# Patient Record
Sex: FEMALE | Race: WHITE | NOT HISPANIC OR LATINO
[De-identification: names, ages, dates, MRNs, and addresses within clinical notes are randomized per-mention and may not be internally consistent; named-entity substitution may affect disease eponyms.]

---

## 2017-01-20 ENCOUNTER — APPOINTMENT (OUTPATIENT)
Dept: NEUROSURGERY | Facility: CLINIC | Age: 62
End: 2017-01-20

## 2017-01-20 VITALS
WEIGHT: 130 LBS | DIASTOLIC BLOOD PRESSURE: 77 MMHG | OXYGEN SATURATION: 98 % | RESPIRATION RATE: 16 BRPM | TEMPERATURE: 98.5 F | SYSTOLIC BLOOD PRESSURE: 121 MMHG | BODY MASS INDEX: 22.2 KG/M2 | HEIGHT: 64 IN | HEART RATE: 79 BPM

## 2017-01-20 DIAGNOSIS — G50.0 TRIGEMINAL NEURALGIA: ICD-10-CM

## 2020-11-10 ENCOUNTER — APPOINTMENT (OUTPATIENT)
Dept: ORTHOPEDIC SURGERY | Facility: CLINIC | Age: 65
End: 2020-11-10
Payer: MEDICARE

## 2020-11-10 VITALS
BODY MASS INDEX: 22.2 KG/M2 | HEIGHT: 64 IN | WEIGHT: 130 LBS | SYSTOLIC BLOOD PRESSURE: 115 MMHG | DIASTOLIC BLOOD PRESSURE: 74 MMHG | HEART RATE: 67 BPM

## 2020-11-10 DIAGNOSIS — R20.2 PARESTHESIA OF SKIN: ICD-10-CM

## 2020-11-10 PROCEDURE — 99204 OFFICE O/P NEW MOD 45 MIN: CPT

## 2020-11-10 NOTE — HISTORY OF PRESENT ILLNESS
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 Had pain at site but no obvious bleeding. Finger began to swell with erythema and went to urgent care 11/5/20. She was placed on oral abx 250mg cephlex 4 times a day and she states it wasn’t getting better so she increased dose. patient still reported minimal improvement and then over the weekend noticed improvements. denies fevers or chills. reported numbness in the finger that has resolved,of note she has baseline numbness in hand due to lupus. denies previous injury to finger. she has been taking NSAIDs over the counter for toe cellulitis, which has helped. elevation and rest relieved the pain. movement and pressure made the pain worse. in regards to her hand and finger numbness she reports that she was told by her rheumatologist that she has carpal tunnel syndrome. she had prior nerve study over 3 years ago that confirmed diagnosis. she reports continued hand numbness worse with driving but does not wake her up at night.

## 2020-11-10 NOTE — PHYSICAL EXAM
[de-identified] : Physical exam shows the patient to be alert and oriented x3, capable of ambulation. The patient is well-developed and well-nourished in no apparent respiratory distress. Majority of the skin is intact bilaterally in the upper extremities without lymphadenopathy at the elbows.\par \par There is a negative Spurling test. There is no sensitivity or Tinel's over the axilla bilaterally in the area of the brachial plexus.\par \par The elbows have a symmetric and full range of motion with no pain upon forced flexion or extension bilaterally. There is no tenderness over the medial or lateral epicondyles bilaterally. The biceps and triceps are intact bilaterally with 5 over 5 strength. There is no joint effusion or instability of the medial and lateral collateral ligament complex bilaterally. There is no sensitivity of the median ulnar or radial nerves with provocative tests bilaterally.\par \par The wrists have a symmetric range of motion bilaterally. There is no tenderness over the scaphoid, scapholunate or lunotriquetral ligaments bilaterally. There is a negative Staples test bilaterally. There is negative tenderness over the radial ulnar joint or TFCC and no evidence of instability bilaterally. No tenderness over the pisotriquetral or hamate hook or CMC joint bilaterally. There is 5 over 5 strength of the wrists bilaterally.\par \par There is a negative Finkelstein test bilaterally and no tenderness over the A1 pulleys. There is no tenderness or instability of the MP PIP or DIP joints in the digits bilaterally with no evidence of digital malrotation.\par \par There is no sensitivity or masses around the ulnar nerve at the level of the wrist bilaterally.\par \par \par There is 2+ pulses over the radial arteries bilaterally with good capillary refill.\par \par There is a negative Tinel's and a positive Phalen's test at 15 seconds on the left. There is no thenar atrophy, good opposition is present. The remaining intrinsic musculature has good strength bilaterally.\par \par Sensation is intact in the median nerve distribution\par

## 2020-12-04 ENCOUNTER — TRANSCRIPTION ENCOUNTER (OUTPATIENT)
Age: 65
End: 2020-12-04

## 2021-03-31 ENCOUNTER — TRANSCRIPTION ENCOUNTER (OUTPATIENT)
Age: 66
End: 2021-03-31

## 2021-04-12 ENCOUNTER — APPOINTMENT (OUTPATIENT)
Dept: NEUROSURGERY | Facility: CLINIC | Age: 66
End: 2021-04-12

## 2021-04-13 ENCOUNTER — APPOINTMENT (OUTPATIENT)
Dept: NEUROSURGERY | Facility: CLINIC | Age: 66
End: 2021-04-13
Payer: MEDICARE

## 2021-04-13 VITALS
RESPIRATION RATE: 16 BRPM | OXYGEN SATURATION: 97 % | SYSTOLIC BLOOD PRESSURE: 137 MMHG | DIASTOLIC BLOOD PRESSURE: 80 MMHG | TEMPERATURE: 98.1 F | WEIGHT: 140 LBS | HEART RATE: 61 BPM | HEIGHT: 64 IN | BODY MASS INDEX: 23.9 KG/M2

## 2021-04-13 PROCEDURE — 99203 OFFICE O/P NEW LOW 30 MIN: CPT

## 2021-04-15 NOTE — HISTORY OF PRESENT ILLNESS
[de-identified] : LEFT pulsatile tinnitus\par - started 6 months ago\par - in sync with heart beat\par - progressively worse\par \par Also non-pulsatile tinnitus bilateral\par \par Hx of surgery 5 years ago for Trigeminal Neuralgia\par \par MRA w/o contrast without abnormal vessels to suggest DAVF\par

## 2021-04-15 NOTE — ASSESSMENT
[FreeTextEntry1] : LEFT pulsatile tinnitus\par Concern for dural fistula given history of prior surgery\par \par PLAN\par MRA with and without contrast (TRICKS) to rule out occult  dural arteriovenous fistula (DAVF)\par Discuss indication for catheter angiogram after the above

## 2021-04-21 ENCOUNTER — TRANSCRIPTION ENCOUNTER (OUTPATIENT)
Age: 66
End: 2021-04-21

## 2021-05-11 ENCOUNTER — APPOINTMENT (OUTPATIENT)
Dept: MRI IMAGING | Facility: CLINIC | Age: 66
End: 2021-05-11
Payer: MEDICARE

## 2021-05-11 ENCOUNTER — OUTPATIENT (OUTPATIENT)
Dept: OUTPATIENT SERVICES | Facility: HOSPITAL | Age: 66
LOS: 1 days | End: 2021-05-11
Payer: MEDICARE

## 2021-05-11 DIAGNOSIS — H93.A9 PULSATILE TINNITUS, UNSPECIFIED EAR: ICD-10-CM

## 2021-05-11 DIAGNOSIS — Z98.1 ARTHRODESIS STATUS: Chronic | ICD-10-CM

## 2021-05-11 DIAGNOSIS — Z98.89 OTHER SPECIFIED POSTPROCEDURAL STATES: Chronic | ICD-10-CM

## 2021-05-11 PROCEDURE — A9585: CPT

## 2021-05-11 PROCEDURE — G1004: CPT

## 2021-05-11 PROCEDURE — 70546 MR ANGIOGRAPH HEAD W/O&W/DYE: CPT | Mod: 26,ME

## 2021-05-11 PROCEDURE — 70546 MR ANGIOGRAPH HEAD W/O&W/DYE: CPT

## 2021-06-03 ENCOUNTER — APPOINTMENT (OUTPATIENT)
Dept: NEUROSURGERY | Facility: CLINIC | Age: 66
End: 2021-06-03
Payer: MEDICARE

## 2021-06-03 PROCEDURE — 99213 OFFICE O/P EST LOW 20 MIN: CPT | Mod: 95

## 2021-06-03 NOTE — HISTORY OF PRESENT ILLNESS
[FreeTextEntry1] : Ms. Zelaya is a pleasant 67yo female who presents today for follow up of left pulsatile tinnitus and MRA review.\par \par LEFT pulsatile tinnitus\par - started 8 months ago\par - in sync with heart beat\par - progressively worse\par - constant\par \par Also non-pulsatile tinnitus bilateral, worse recently.\par \par The pulsatile tinnitus is the most bothersome.\par \par Hx of surgery 5 years ago for Trigeminal Neuralgia\par \par Had recent MRA TRICKS with abnormal enhancement in the surgical area but no obvious AV shunt

## 2021-06-03 NOTE — REASON FOR VISIT
[Home] : at home, [unfilled] , at the time of the visit. [Medical Office: ([facility])___] : at the medical office located in  [Verbal consent obtained from patient] : the patient, [unfilled] [Follow-Up: _____] : a [unfilled] follow-up visit

## 2021-06-03 NOTE — DATA REVIEWED
[de-identified] : \par EXAM: MR ANGIO BRAIN WAW IC\par \par \par PROCEDURE DATE: 05/11/2021\par \par \par \par INTERPRETATION: Clinical indications: Left-sided pulsatile tinnitus.\par \par MRA of the [place] was performed using 3-D [place] technique. The patient was injected with approximately 7 cc Gadavist IV with 3 cc contrast carotid. Gadolinium infusion MRA the [place] was performed.\par \par Postop changes compatible with a left suboccipital craniotomy is identified.\par \par Both distal internal carotid arteries demonstrate normal flow. Evaluation of the anterior cerebral, middle cerebral, basilar and posterior cerebral arteries appear normal without evidence of an aneurysm or significant stenosis. Prominent P-comm is seen on the left side. The dural venous sinuses demonstrate normal enhancement.\par \par Both internal jugular veins and large intracerebral veins appear normal.\par \par IMPRESSION: Unremarkable MRA of the [place].\par \par \par \par \par \par \par SILVER ELIZABETH M.D., ATTENDING RADIOLOGIST\par This document has been electronically signed. May 11 2021 4:03PM

## 2021-06-03 NOTE — ASSESSMENT
[FreeTextEntry1] : LEFT pulsatile tinnitus, constant and very bothersome.\par \par MRA with no obvious AV shunt but given history of surgery in the LEFT mastoid area, I recommended catheter cerebral angiogram for evaluation.\par \par The risks, benefits and alternatives of catheter angiogram were discussed with the patient in detail. In my personal experience, the risks are very rare, but the possibility is not zero. Risks include stroke, brain hemorrhage, any type of disability (facial or extremity weakness, facial or extremity numbness, speech difficulties, blindness) and others. There are also possible complications related to the incisions such as infection, pain, swelling and bleeding.\par \par PLAN\par Catheter Cerebral Angiogram/ Venogram/ Manometry

## 2021-06-03 NOTE — REVIEW OF SYSTEMS
[As Noted in HPI] : as noted in HPI [Negative] : Heme/Lymph [FreeTextEntry4] : Pulsatile and non pulsatile tinnitus

## 2021-06-21 ENCOUNTER — TRANSCRIPTION ENCOUNTER (OUTPATIENT)
Age: 66
End: 2021-06-21

## 2021-06-22 ENCOUNTER — NON-APPOINTMENT (OUTPATIENT)
Age: 66
End: 2021-06-22

## 2021-06-22 ENCOUNTER — TRANSCRIPTION ENCOUNTER (OUTPATIENT)
Age: 66
End: 2021-06-22

## 2021-07-19 ENCOUNTER — OUTPATIENT (OUTPATIENT)
Dept: OUTPATIENT SERVICES | Facility: HOSPITAL | Age: 66
LOS: 1 days | End: 2021-07-19
Payer: MEDICARE

## 2021-07-19 VITALS
TEMPERATURE: 98 F | DIASTOLIC BLOOD PRESSURE: 76 MMHG | WEIGHT: 138.01 LBS | HEIGHT: 65 IN | HEART RATE: 56 BPM | SYSTOLIC BLOOD PRESSURE: 126 MMHG | RESPIRATION RATE: 18 BRPM | OXYGEN SATURATION: 99 %

## 2021-07-19 DIAGNOSIS — H93.A9 PULSATILE TINNITUS, UNSPECIFIED EAR: ICD-10-CM

## 2021-07-19 DIAGNOSIS — Z98.1 ARTHRODESIS STATUS: Chronic | ICD-10-CM

## 2021-07-19 DIAGNOSIS — K21.9 GASTRO-ESOPHAGEAL REFLUX DISEASE WITHOUT ESOPHAGITIS: ICD-10-CM

## 2021-07-19 DIAGNOSIS — M32.9 SYSTEMIC LUPUS ERYTHEMATOSUS, UNSPECIFIED: ICD-10-CM

## 2021-07-19 DIAGNOSIS — F41.9 ANXIETY DISORDER, UNSPECIFIED: ICD-10-CM

## 2021-07-19 DIAGNOSIS — Z98.890 OTHER SPECIFIED POSTPROCEDURAL STATES: Chronic | ICD-10-CM

## 2021-07-19 DIAGNOSIS — Z98.89 OTHER SPECIFIED POSTPROCEDURAL STATES: Chronic | ICD-10-CM

## 2021-07-19 DIAGNOSIS — Z01.818 ENCOUNTER FOR OTHER PREPROCEDURAL EXAMINATION: ICD-10-CM

## 2021-07-19 LAB
ANION GAP SERPL CALC-SCNC: 13 MMOL/L — SIGNIFICANT CHANGE UP (ref 5–17)
APTT BLD: 32.9 SEC — SIGNIFICANT CHANGE UP (ref 27.5–35.5)
BUN SERPL-MCNC: 15 MG/DL — SIGNIFICANT CHANGE UP (ref 7–23)
CALCIUM SERPL-MCNC: 9.7 MG/DL — SIGNIFICANT CHANGE UP (ref 8.4–10.5)
CHLORIDE SERPL-SCNC: 95 MMOL/L — LOW (ref 96–108)
CO2 SERPL-SCNC: 25 MMOL/L — SIGNIFICANT CHANGE UP (ref 22–31)
CREAT SERPL-MCNC: 0.75 MG/DL — SIGNIFICANT CHANGE UP (ref 0.5–1.3)
GLUCOSE SERPL-MCNC: 99 MG/DL — SIGNIFICANT CHANGE UP (ref 70–99)
HCT VFR BLD CALC: 43.5 % — SIGNIFICANT CHANGE UP (ref 34.5–45)
HGB BLD-MCNC: 14.5 G/DL — SIGNIFICANT CHANGE UP (ref 11.5–15.5)
INR BLD: 1.04 RATIO — SIGNIFICANT CHANGE UP (ref 0.88–1.16)
MCHC RBC-ENTMCNC: 32.2 PG — SIGNIFICANT CHANGE UP (ref 27–34)
MCHC RBC-ENTMCNC: 33.3 GM/DL — SIGNIFICANT CHANGE UP (ref 32–36)
MCV RBC AUTO: 96.7 FL — SIGNIFICANT CHANGE UP (ref 80–100)
NRBC # BLD: 0 /100 WBCS — SIGNIFICANT CHANGE UP (ref 0–0)
PLATELET # BLD AUTO: 248 K/UL — SIGNIFICANT CHANGE UP (ref 150–400)
POTASSIUM SERPL-MCNC: 4.3 MMOL/L — SIGNIFICANT CHANGE UP (ref 3.5–5.3)
POTASSIUM SERPL-SCNC: 4.3 MMOL/L — SIGNIFICANT CHANGE UP (ref 3.5–5.3)
PROTHROM AB SERPL-ACNC: 12.5 SEC — SIGNIFICANT CHANGE UP (ref 10.6–13.6)
RBC # BLD: 4.5 M/UL — SIGNIFICANT CHANGE UP (ref 3.8–5.2)
RBC # FLD: 11.9 % — SIGNIFICANT CHANGE UP (ref 10.3–14.5)
SODIUM SERPL-SCNC: 133 MMOL/L — LOW (ref 135–145)
WBC # BLD: 6.1 K/UL — SIGNIFICANT CHANGE UP (ref 3.8–10.5)
WBC # FLD AUTO: 6.1 K/UL — SIGNIFICANT CHANGE UP (ref 3.8–10.5)

## 2021-07-19 PROCEDURE — 80048 BASIC METABOLIC PNL TOTAL CA: CPT

## 2021-07-19 PROCEDURE — 85730 THROMBOPLASTIN TIME PARTIAL: CPT

## 2021-07-19 PROCEDURE — 85610 PROTHROMBIN TIME: CPT

## 2021-07-19 PROCEDURE — G0463: CPT

## 2021-07-19 PROCEDURE — 85027 COMPLETE CBC AUTOMATED: CPT

## 2021-07-19 NOTE — H&P PST ADULT - NSANTHOSAYNRD_GEN_A_CORE
No. ALBAN screening performed.  STOP BANG Legend: 0-2 = LOW Risk; 3-4 = INTERMEDIATE Risk; 5-8 = HIGH Risk

## 2021-07-19 NOTE — H&P PST ADULT - NSICDXPASTMEDICALHX_GEN_ALL_CORE_FT
PAST MEDICAL HISTORY:  COVID-19 virus infection November 2020  (SOB, treated at home on nebulizer, no hospitalization)    History of sciatica right side mostly    Lupus aymptomatic  dx x 23 years   no exarbation    Trigeminal neuralgia      PAST MEDICAL HISTORY:  Anxiety disorder     COVID-19 virus infection November 2020  (SOB, treated at home on nebulizer, no hospitalization)    History of sciatica right side mostly    Lupus aymptomatic  dx x 23 years   no exarbation    Trigeminal neuralgia

## 2021-07-19 NOTE — H&P PST ADULT - NSICDXPASTSURGICALHX_GEN_ALL_CORE_FT
PAST SURGICAL HISTORY:  H/O brain surgery for trigeminal neuralgia 2016 shunt placed    History of endoscopy h/o upper and lower endoscopy 2021  colonoscopy with polypectomy (x 2 polyps benign)    S/P carpal tunnel release right  2014    S/P spinal fusion 2010  lumbar 3-4

## 2021-07-19 NOTE — H&P PST ADULT - HISTORY OF PRESENT ILLNESS
covid 11/2020 (SOB nebulizer 66 year old female s/p left retrosigmoid craniotomy with microvascular decompression of left trigeminal nerve with Dr. Rod on 10/11/2016, c/o worsening pulsatile tinnitus x 1 year, recent MRA showed no AV-shunt, but was advised to have a venogram for evaluation given her surgical history in 2016. Patient denies any headaches, no blurred vision, occasional vertigo. Her medical history also significant for systemic lupus x 23 years (no exacerbation), previous covid-19 infection in 11/2020 (no hospitalization, had some SOB, treated with nebulizer), GERD on medication, sciatica (mostly right side), lumbar spine fusion (L3-4) 2010, right carpal tunnel release (2014), recent colonoscopy/ polypectomy 2021, anxiety disorders, former smoker 10pk/yrs (quit 19 yrs ago).  Patient is fully vaccinated.

## 2021-07-19 NOTE — H&P PST ADULT - NSICDXPROBLEM_GEN_ALL_CORE_FT
PROBLEM DIAGNOSES  Problem: Systemic lupus erythematosus  Assessment and Plan: scheduled for venogram   pre-op instruction discussed and patient verbalized understanding    Problem: Anxiety disorder  Assessment and Plan: instructed to continue     Problem: GERD (gastroesophageal reflux disease)  Assessment and Plan:     Problem: Pulsatile tinnitus  Assessment and Plan:        PROBLEM DIAGNOSES  Problem: Pulsatile tinnitus  Assessment and Plan: scheduled for venogram   preop instruction discussed and patient verbalized understanding     Problem: Systemic lupus erythematosus  Assessment and Plan: instructed to continue medication per prescribing physician     Problem: Anxiety disorder  Assessment and Plan: instructed to continue medication per prescribing physician     Problem: GERD (gastroesophageal reflux disease)  Assessment and Plan: instructed to continue medication per prescribing physician

## 2021-07-21 ENCOUNTER — NON-APPOINTMENT (OUTPATIENT)
Age: 66
End: 2021-07-21

## 2021-07-21 ENCOUNTER — APPOINTMENT (OUTPATIENT)
Dept: NEUROSURGERY | Facility: HOSPITAL | Age: 66
End: 2021-07-21

## 2021-07-21 ENCOUNTER — OUTPATIENT (OUTPATIENT)
Dept: OUTPATIENT SERVICES | Facility: HOSPITAL | Age: 66
LOS: 1 days | End: 2021-07-21
Payer: MEDICARE

## 2021-07-21 VITALS
HEIGHT: 65 IN | HEART RATE: 70 BPM | SYSTOLIC BLOOD PRESSURE: 141 MMHG | WEIGHT: 138.89 LBS | OXYGEN SATURATION: 100 % | TEMPERATURE: 98 F | RESPIRATION RATE: 18 BRPM | DIASTOLIC BLOOD PRESSURE: 88 MMHG

## 2021-07-21 VITALS
OXYGEN SATURATION: 98 % | HEART RATE: 67 BPM | RESPIRATION RATE: 14 BRPM | SYSTOLIC BLOOD PRESSURE: 130 MMHG | DIASTOLIC BLOOD PRESSURE: 71 MMHG

## 2021-07-21 DIAGNOSIS — Z01.818 ENCOUNTER FOR OTHER PREPROCEDURAL EXAMINATION: ICD-10-CM

## 2021-07-21 DIAGNOSIS — Z98.890 OTHER SPECIFIED POSTPROCEDURAL STATES: Chronic | ICD-10-CM

## 2021-07-21 DIAGNOSIS — Z98.89 OTHER SPECIFIED POSTPROCEDURAL STATES: Chronic | ICD-10-CM

## 2021-07-21 DIAGNOSIS — H93.A9 PULSATILE TINNITUS, UNSPECIFIED EAR: ICD-10-CM

## 2021-07-21 DIAGNOSIS — Z98.1 ARTHRODESIS STATUS: Chronic | ICD-10-CM

## 2021-07-21 PROCEDURE — 61623 EVASC TEMP BALO ARTL OCC H/N: CPT

## 2021-07-21 PROCEDURE — 36226 PLACE CATH VERTEBRAL ART: CPT

## 2021-07-21 PROCEDURE — C2628: CPT

## 2021-07-21 PROCEDURE — C1769: CPT

## 2021-07-21 PROCEDURE — 36226 PLACE CATH VERTEBRAL ART: CPT | Mod: 50

## 2021-07-21 PROCEDURE — C1887: CPT

## 2021-07-21 PROCEDURE — C1894: CPT

## 2021-07-21 PROCEDURE — 36223 PLACE CATH CAROTID/INOM ART: CPT | Mod: 50

## 2021-07-21 PROCEDURE — 75860 VEIN X-RAY NECK: CPT | Mod: 26,59

## 2021-07-21 PROCEDURE — 36223 PLACE CATH CAROTID/INOM ART: CPT

## 2021-07-21 PROCEDURE — 36012 PLACE CATHETER IN VEIN: CPT | Mod: LT

## 2021-07-21 PROCEDURE — C1760: CPT

## 2021-07-21 PROCEDURE — 75870 VEIN X-RAY SKULL: CPT | Mod: 26,59

## 2021-07-21 RX ORDER — SODIUM CHLORIDE 9 MG/ML
1000 INJECTION, SOLUTION INTRAVENOUS
Refills: 0 | Status: DISCONTINUED | OUTPATIENT
Start: 2021-07-21 | End: 2021-08-05

## 2021-07-21 RX ORDER — SODIUM CHLORIDE 9 MG/ML
1000 INJECTION INTRAMUSCULAR; INTRAVENOUS; SUBCUTANEOUS
Refills: 0 | Status: DISCONTINUED | OUTPATIENT
Start: 2021-07-21 | End: 2021-08-05

## 2021-07-21 NOTE — ASU PATIENT PROFILE, ADULT - PMH
Anxiety disorder    COVID-19 virus infection  November 2020  (SOB, treated at home on nebulizer, no hospitalization)  History of sciatica  right side mostly  Lupus  aymptomatic  dx x 23 years   no exarbation  Trigeminal neuralgia

## 2021-07-21 NOTE — ASU PATIENT PROFILE, ADULT - PSH
H/O brain surgery  for trigeminal neuralgia 2016 shunt placed  History of endoscopy  h/o upper and lower endoscopy 2021  colonoscopy with polypectomy (x 2 polyps benign)  S/P carpal tunnel release  right  2014  S/P spinal fusion  2010  lumbar 3-4

## 2021-07-21 NOTE — ASU DISCHARGE PLAN (ADULT/PEDIATRIC) - CARE PROVIDER_API CALL
Lexy Fox; MPH)  Radiology  [address], [address]  [place] [address]  Phone: [phone]  Fax: [phone]  Follow Up Time:

## 2021-07-21 NOTE — CHART NOTE - NSCHARTNOTEFT_GEN_A_CORE
Interventional Neuro- Radiology   Procedure Note PA-TORRI    Procedure: Catheter Cerebral Angiogram   Pre- Procedure Diagnosis:  Post- Procedure Diagnosis:    : Dr Lexy Fox   fellow:     Dr Michael Cain   Physician Assistant: Erinn Pedro PA-C    Nurse:  Radiologic Tech:  Anesthesiologist:  Sheath:      I/Os: EBL less than 10cc  IV fluids:     cc     Urine output     cc    Contrast Omnipaque 240      cc             Vitals: BP         HR      Spo2     %          Preliminary Report:  Using a 5 South Sudanese long sheath to the right groin under MAC sedation via left vertebral artery,  left internal carotid artery, left external carotid artery, right vertebral artery, right internal carotid artery, right external carotid artery a selective cerebral angiography was performed and demonstrated                       Official note to follow.  Patient tolerated procedure well, hemodynamically stable, no change in neurological status compared to baseline.  Results discussed with neuro ICU team, patient and patient's family. Right groin sheath was removed, manual compression held to hemostasis for 20 minutes, no active bleeding, no hematoma, quick clot and safeguard balloon dressing applied at Interventional Neuro- Radiology   Procedure Note PA-C    Procedure: Catheter Cerebral Angiogram, venogram and manometry    Pre- Procedure Diagnosis:  Post- Procedure Diagnosis:    : Dr Lexy Fox   fellow:     Dr Michael Cobos   Physician Assistant: Erinn Pedro PA-C    Nurse:                   Kwaku Marc RN  Radiologic Tech:   Ross Bagley LRT  Anesthesiologist:  Dr Shannon Garcia   Sheath:                 5/4 short sheath to the right radial artery       I/Os: EBL less than 10cc  IV fluids: 200cc  Urine due to void  Contrast Omnipaque 240           Vitals: BP 91/50        HR 74     Spo2  100%          Preliminary Report:  Using a 5/4 Thai short sheath to the right radial artery under MAC sedation via left vertebral artery, left internal carotid artery, left external carotid artery, right vertebral artery, right internal carotid artery, right external carotid artery a selective cerebral angiography was performed and demonstrated                       Official note to follow.  Patient tolerated procedure well, hemodynamically stable, no change in neurological status compared to baseline. Results discussed with patient and patient's . Right groin sheath was removed, manual compression held to hemostasis for 20 minutes, no active bleeding, no hematoma, quick clot and safeguard balloon dressing applied at Interventional Neuro- Radiology   Procedure Note PA-C    Procedure: Catheter Cerebral Angiogram, venogram and manometry    Pre- Procedure Diagnosis: left pulsatile tinnitus   Post- Procedure Diagnosis: left pulsatile tinnitus     : Dr Lexy Fox   fellow:     Dr Michael Cobos   Physician Assistant: Erinn Pedro PA-C    Nurse:                   Kwaku Marc RN  Radiologic Tech:   Ross Bagley T  Anesthesiologist:  Dr Shannon Garcia   Sheath:                 5/4 short sheath to the right radial artery       I/Os: EBL less than 10cc  IV fluids: 200cc  Urine due to void  Contrast Omnipaque 240           Vitals: BP 91/50        HR 74     Spo2  100%          Preliminary Report:  Using a 5/4 Welsh short sheath to the right radial artery under MAC sedation via left vertebral artery, left internal carotid artery, left external carotid artery, right vertebral artery, right internal carotid artery, right external carotid artery a selective cerebral angiography was performed and demonstrated                       Official note to follow.  Patient tolerated procedure well, hemodynamically stable, no change in neurological status compared to baseline. Results discussed with patient and patient's . Right groin sheath was removed, manual compression held to hemostasis for 20 minutes, no active bleeding, no hematoma, quick clot and safeguard balloon dressing applied at Interventional Neuro- Radiology   Procedure Note PA-C    Procedure: Catheter Cerebral Angiogram, venogram and manometry    Pre- Procedure Diagnosis: left pulsatile tinnitus   Post- Procedure Diagnosis: left pulsatile tinnitus     : Dr Lexy Fox   fellow:     Dr Michael Cobos   Physician Assistant: Erinn Pedro PA-C  Resident:                Dr Kindra Vance     Nurse:                   Kwaku Marc RN  Radiologic Tech:   Ross Bagley LRT       Sean Meredith LRT  Anesthesiologist:  Dr Shannon Garcia   Sheath:                 5/4 short sheath to the right radial artery       I/Os: EBL less than 10cc  IV fluids: 200cc  Urine due to void  Contrast Omnipaque 240           Vitals: BP 91/50        HR 74     Spo2  100%          Preliminary Report:  Using a 5/4 Slovenian short sheath to the right radial artery under MAC sedation via left vertebral artery, left internal carotid artery, left external carotid artery, right vertebral artery, right internal carotid artery, right external carotid artery a selective cerebral angiography. Official note to follow.  Patient tolerated procedure well, hemodynamically stable, no change in neurological status compared to baseline. Results discussed with patient and patient's . Right groin sheath was removed, manual compression held to hemostasis for 20 minutes, no active bleeding, no hematoma, quick clot and safeguard balloon dressing applied at Interventional Neuro- Radiology   Procedure Note PA-C    Procedure: Catheter Cerebral Angiogram, venogram and balloon test occlusion     Pre- Procedure Diagnosis: left pulsatile tinnitus   Post- Procedure Diagnosis: left pulsatile tinnitus     : Dr Lexy Fox   fellow:     Dr Michael Cobos   Physician Assistant: MICHAEL SalcedoC  Resident:                Dr Kindra Vance     Nurse:                   Kwaku Marc RN  Radiologic Tech:   Ross Bagley LRT       Sean Meredith LRT  Anesthesiologist:  Dr Shannon Garcia   Sheath:                 5/4 short sheath to the right radial artery   Sheath:                 5 Cameroonian short sheath to the right femoral vein   ACT:                       174      I/Os: EBL less than 10cc  IV fluids: 350cc  Urine due to void  Contrast Omnipaque 240 132cc          Vitals: BP 91/50        HR 74     Spo2  100%          Preliminary Report:  Using a 5/4 Cam[place]onian short sheath to the right radial artery under MAC sedation a selective cerebral angiogram, venogram, balloon test occlusion were performed. Official note to follow.  Patient tolerated procedure well, hemodynamically stable, no change in neurological status compared to baseline. Results discussed with patient and patient's . Right groin sheath was removed, manual compression held to hemostasis for 20 minutes, no active bleeding, no hematoma, quick clot and safeguard balloon dressing applied. TR band was placed on right wrist. Disposition recovery room and then discharge home. Interventional Neuro- Radiology   Procedure Note PA-C    Procedure: Catheter Cerebral Angiogram, venogram and balloon test occlusion     Pre- Procedure Diagnosis: left pulsatile tinnitus   Post- Procedure Diagnosis: left pulsatile tinnitus     : Dr Lexy Fox   fellow:     Dr Michael Cobos   Physician Assistant: MICHAEL SalcedoC  Resident:                Dr Kindra Vance     Nurse:                   Kwaku Marc RN  Radiologic Tech:   Ross Bagley LRT       Sean Meredith LRT  Anesthesiologist:  Dr Shannon Garcia   Sheath:                 5/4 short sheath to the right radial artery   Sheath:                 5 Nepalese short sheath to the right femoral vein   ACT:                      174      I/Os: EBL less than 10cc  IV fluids: 350cc  Urine due to void  Contrast Omnipaque 240 132cc          Vitals: BP 91/50        HR 74     Spo2  100%          Preliminary Report:  Using a 5/4 Nepalese short sheath to the right radial artery under MAC sedation a selective cerebral angiogram, venogram, balloon test occlusion were performed. Official note to follow.  Patient tolerated procedure well, hemodynamically stable, no change in neurological status compared to baseline. Results discussed with patient and patient's . Right groin sheath was removed, manual compression held to hemostasis for 20 minutes, no active bleeding, no hematoma, quick clot and safeguard balloon dressing applied. TR band was placed on right wrist. Disposition recovery room and then discharge home. Interventional Neuro- Radiology   Procedure Note PA-C    Procedure: Catheter Cerebral Angiogram, venogram and balloon test occlusion     Pre- Procedure Diagnosis: left pulsatile tinnitus   Post- Procedure Diagnosis: left pulsatile tinnitus     : Dr Lexy Fox   fellow:     Dr Michael Cobos   Physician Assistant: MICHAEL SalcedoC  Resident:                Dr Kindra Vance     Nurse:                   Kwaku Marc RN  Radiologic Tech:   Ross Bagley LRT       Sean Meredith LRT  Anesthesiologist:  Dr Shannon Garcia   Sheath:                 5/4 short sheath to the right radial artery   Sheath:                 5 Burkinan short sheath to the right femoral vein   ACT:                     174      I/Os: EBL less than 10cc  IV fluids: 350cc  Urine due to void  Contrast Omnipaque 240 132cc          Vitals: BP 91/50        HR 74     Spo2  100%          Preliminary Report:  Using a 5/4 Burkinan short sheath to the right radial artery under MAC sedation a selective cerebral angiogram, venogram, balloon test occlusion were performed. Official note to follow.  Patient tolerated procedure well, hemodynamically stable, no change in neurological status compared to baseline. Results discussed with patient and patient's . Right groin sheath was removed, manual compression held to hemostasis for 20 minutes, no active bleeding, no hematoma, quick clot and safeguard balloon dressing applied. TR band was placed on right wrist. Disposition recovery room and then discharge home.

## 2021-07-21 NOTE — CHART NOTE - NSCHARTNOTEFT_GEN_A_CORE
Interventional Neuro Radiology  Pre-Procedure Note PA-C    This is a 66 year old right hand dominant female with left ear tinnitus, who presents to Neuro IR for a catheter cerebral venogram, angiogram, and manometry           Allergies: No Known Allergies  PMHX: anxiety,Trigeminal neuralgia, Lupus, COVID-19 virus infection, sciatica, trigeminal neuralgia   PSHX: spinal fusion, right carpal tunnel release, brain surgery  Social History:    FAMILY HISTORY: non-contributory   Current Medications:     Labs:                   Blood Bank:       Assessment/Plan:   This is a 66 year old right hand dominant female with left ear tinnitus   Patient presents to neuro-IR for selective cerebral angiography.   Procedure, goals, risks, benefits and alternatives  were discussed with patient and patient's family.  All questions were answered.  Risks include but are not limited to stroke, vessel injury, hemorrhage, and or right  groin hematoma.  Patient demonstrates understanding  of all risks involved with this procedure and wishes to continue.   Appropriate  content was obtained from patient and consent is in the patient's chart. Interventional Neuro Radiology  Pre-Procedure Note PA-C    This is a 66 year old right hand dominant female with left ear tinnitus, who presents to Neuro IR for a catheter cerebral venogram, angiogram, and manometry.    Upon exam patient is A + O x 3, speech is fluent, recent and remote intact, follow commands, motor 5/5, sensation intact, ambulates without assist.    Allergies: No Known Allergies  PMHX: Anxiety, Trigeminal neuralgia, Lupus, COVID-19 virus infection, sciatica, trigeminal neuralgia   PSHX: spinal fusion, right carpal tunnel release, brain surgery  Social History:    FAMILY HISTORY: non-contributory   Current Medications:   Covid; not detected       Assessment/Plan:   This is a 66 year old right hand dominant female with left ear tinnitus who presents to Neuro-IR for selective cerebral angiogram. Procedure, goals, risks, benefits and alternatives were discussed with patient and patient's . All questions were answered. Risks include but are not limited to stroke, vessel injury, hemorrhage, right wrist pain and or right groin hematoma. Patient demonstrates understanding of all risks involved with this procedure and wishes to continue. Appropriate content was obtained from patient and consent is in the patient's chart.

## 2021-07-30 DIAGNOSIS — G08 INTRACRANIAL AND INTRASPINAL PHLEBITIS AND THROMBOPHLEBITIS: ICD-10-CM

## 2021-07-30 DIAGNOSIS — H93.A2 PULSATILE TINNITUS, LEFT EAR: ICD-10-CM

## 2021-08-10 ENCOUNTER — NON-APPOINTMENT (OUTPATIENT)
Age: 66
End: 2021-08-10

## 2021-08-10 RX ORDER — ASPIRIN 325 MG/1
325 TABLET, FILM COATED ORAL DAILY
Qty: 30 | Refills: 6 | Status: ACTIVE | COMMUNITY
Start: 2021-08-10 | End: 1900-01-01

## 2021-09-17 PROBLEM — Z86.69 PERSONAL HISTORY OF OTHER DISEASES OF THE NERVOUS SYSTEM AND SENSE ORGANS: Chronic | Status: ACTIVE | Noted: 2021-07-19

## 2021-09-17 PROBLEM — U07.1 COVID-19: Chronic | Status: ACTIVE | Noted: 2021-07-19

## 2021-09-17 PROBLEM — F41.9 ANXIETY DISORDER, UNSPECIFIED: Chronic | Status: ACTIVE | Noted: 2021-07-19

## 2021-09-20 ENCOUNTER — OUTPATIENT (OUTPATIENT)
Dept: OUTPATIENT SERVICES | Facility: HOSPITAL | Age: 66
LOS: 1 days | End: 2021-09-20
Payer: MEDICARE

## 2021-09-20 VITALS
OXYGEN SATURATION: 97 % | DIASTOLIC BLOOD PRESSURE: 83 MMHG | RESPIRATION RATE: 18 BRPM | SYSTOLIC BLOOD PRESSURE: 126 MMHG | TEMPERATURE: 99 F | WEIGHT: 141.98 LBS | HEIGHT: 65 IN | HEART RATE: 63 BPM

## 2021-09-20 DIAGNOSIS — Z98.1 ARTHRODESIS STATUS: Chronic | ICD-10-CM

## 2021-09-20 DIAGNOSIS — Z01.818 ENCOUNTER FOR OTHER PREPROCEDURAL EXAMINATION: ICD-10-CM

## 2021-09-20 DIAGNOSIS — H93.A9 PULSATILE TINNITUS, UNSPECIFIED EAR: ICD-10-CM

## 2021-09-20 DIAGNOSIS — Z98.890 OTHER SPECIFIED POSTPROCEDURAL STATES: Chronic | ICD-10-CM

## 2021-09-20 DIAGNOSIS — Z86.69 PERSONAL HISTORY OF OTHER DISEASES OF THE NERVOUS SYSTEM AND SENSE ORGANS: ICD-10-CM

## 2021-09-20 DIAGNOSIS — Z98.89 OTHER SPECIFIED POSTPROCEDURAL STATES: Chronic | ICD-10-CM

## 2021-09-20 DIAGNOSIS — Z29.9 ENCOUNTER FOR PROPHYLACTIC MEASURES, UNSPECIFIED: ICD-10-CM

## 2021-09-20 LAB
ALBUMIN SERPL ELPH-MCNC: 5.2 G/DL — HIGH (ref 3.3–5)
ALP SERPL-CCNC: 50 U/L — SIGNIFICANT CHANGE UP (ref 40–120)
ALT FLD-CCNC: 18 U/L — SIGNIFICANT CHANGE UP (ref 10–45)
ANION GAP SERPL CALC-SCNC: 15 MMOL/L — SIGNIFICANT CHANGE UP (ref 5–17)
AST SERPL-CCNC: 24 U/L — SIGNIFICANT CHANGE UP (ref 10–40)
BILIRUB SERPL-MCNC: 0.4 MG/DL — SIGNIFICANT CHANGE UP (ref 0.2–1.2)
BUN SERPL-MCNC: 12 MG/DL — SIGNIFICANT CHANGE UP (ref 7–23)
CALCIUM SERPL-MCNC: 9.9 MG/DL — SIGNIFICANT CHANGE UP (ref 8.4–10.5)
CHLORIDE SERPL-SCNC: 99 MMOL/L — SIGNIFICANT CHANGE UP (ref 96–108)
CO2 SERPL-SCNC: 25 MMOL/L — SIGNIFICANT CHANGE UP (ref 22–31)
CREAT SERPL-MCNC: 0.87 MG/DL — SIGNIFICANT CHANGE UP (ref 0.5–1.3)
GLUCOSE SERPL-MCNC: 97 MG/DL — SIGNIFICANT CHANGE UP (ref 70–99)
HCT VFR BLD CALC: 42.7 % — SIGNIFICANT CHANGE UP (ref 34.5–45)
HGB BLD-MCNC: 14 G/DL — SIGNIFICANT CHANGE UP (ref 11.5–15.5)
INR BLD: 1.03 RATIO — SIGNIFICANT CHANGE UP (ref 0.88–1.16)
MCHC RBC-ENTMCNC: 32.8 GM/DL — SIGNIFICANT CHANGE UP (ref 32–36)
MCHC RBC-ENTMCNC: 33.2 PG — SIGNIFICANT CHANGE UP (ref 27–34)
MCV RBC AUTO: 101.2 FL — HIGH (ref 80–100)
NRBC # BLD: 0 /100 WBCS — SIGNIFICANT CHANGE UP (ref 0–0)
PLATELET # BLD AUTO: 213 K/UL — SIGNIFICANT CHANGE UP (ref 150–400)
POTASSIUM SERPL-MCNC: 4 MMOL/L — SIGNIFICANT CHANGE UP (ref 3.5–5.3)
POTASSIUM SERPL-SCNC: 4 MMOL/L — SIGNIFICANT CHANGE UP (ref 3.5–5.3)
PROT SERPL-MCNC: 7.2 G/DL — SIGNIFICANT CHANGE UP (ref 6–8.3)
PROTHROM AB SERPL-ACNC: 12.3 SEC — SIGNIFICANT CHANGE UP (ref 10.6–13.6)
RBC # BLD: 4.22 M/UL — SIGNIFICANT CHANGE UP (ref 3.8–5.2)
RBC # FLD: 11.8 % — SIGNIFICANT CHANGE UP (ref 10.3–14.5)
SODIUM SERPL-SCNC: 139 MMOL/L — SIGNIFICANT CHANGE UP (ref 135–145)
WBC # BLD: 5.43 K/UL — SIGNIFICANT CHANGE UP (ref 3.8–10.5)
WBC # FLD AUTO: 5.43 K/UL — SIGNIFICANT CHANGE UP (ref 3.8–10.5)

## 2021-09-20 PROCEDURE — G0463: CPT

## 2021-09-20 PROCEDURE — 86850 RBC ANTIBODY SCREEN: CPT

## 2021-09-20 PROCEDURE — 86901 BLOOD TYPING SEROLOGIC RH(D): CPT

## 2021-09-20 PROCEDURE — 86900 BLOOD TYPING SEROLOGIC ABO: CPT

## 2021-09-20 NOTE — H&P PST ADULT - NSICDXPASTSURGICALHX_GEN_ALL_CORE_FT
PAST SURGICAL HISTORY:  H/O brain surgery for trigeminal neuralgia 2016 shunt placed    History of endoscopy h/o upper and lower endoscopy 2021  colonoscopy with polypectomy (x 2 polyps benign)    Normal vaginal delivery     S/P carpal tunnel release right  2014    S/P spinal fusion 2010  lumbar 3-4

## 2021-09-20 NOTE — H&P PST ADULT - HISTORY OF PRESENT ILLNESS
66 year old female with h/o SLE (last flare x 4 months ago, trigeminal neuralgia, anxiety disorders, previous h/o of covid-19 infection (November 2020, no hospitalization), presents for preop testing for venous sinus stent for pulsatile tinnitus on 10/01/2021 with Dr. Fox. Patient denies any dizziness, no unsteady gait.  patient denies any s/s of covid-19 infection, nor any known exposure, scheduled for covid-19 PCR swab on 9/28/2021 at [facility].

## 2021-09-20 NOTE — H&P PST ADULT - NSICDXPASTMEDICALHX_GEN_ALL_CORE_FT
PAST MEDICAL HISTORY:  Anxiety disorder     COVID-19 virus infection November 2020  (SOB, treated at home on nebulizer, no hospitalization)    History of sciatica right side mostly    Lupus aymptomatic  dx x 23 years   last flare 4 months ado    Trigeminal neuralgia      PAST MEDICAL HISTORY:  Anxiety disorder     COVID-19 virus infection November 2020  (SOB, treated at home on nebulizer, no hospitalization)    History of sciatica right side mostly    History of tinnitus     Lupus dx x 23 years   last flare 4 months ago    Trigeminal neuralgia

## 2021-09-20 NOTE — H&P PST ADULT - PROBLEM SELECTOR PLAN 1
scheduled for venous sinus stent   preop instruction discussed and patient verbalized understanding  labs result pending

## 2021-09-20 NOTE — H&P PST ADULT - ASSESSMENT
CAPRINI SCORE [CLOT updated 18]    AGE RELATED RISK FACTORS                                                       MOBILITY RELATED FACTORS  [ ] Age 41-60 years                                            (1 Point)                    [ ] Bed rest                                                        (1 Point)  [2 ] Age: 61-74 years                                           (2 Points)                  [ ] Plaster cast                                                   (2 Points)  [ ] Age= 75 years                                              (3 Points)                    [ ] Bed bound for more than 72 hours                 (2 Points)    DISEASE RELATED RISK FACTORS                                               GENDER SPECIFIC FACTORS  [ ] Edema in the lower extremities                       (1 Point)              [ ] Pregnancy                                                     (1 Point)  [ ] Varicose veins                                               (1 Point)                     [ ] Post-partum < 6 weeks                                   (1 Point)             [ ] BMI > 25 Kg/m2                                            (1 Point)                     [ ] Hormonal therapy  or oral contraception          (1 Point)                 [ ] Sepsis (in the previous month)                        (1 Point)               [ ] History of pregnancy complications                 (1 point)  [ ] Pneumonia or serious lung disease                                               [ ] Unexplained or recurrent                     (1 Point)           (in the previous month)                               (1 Point)  [ ] Abnormal pulmonary function test                     (1 Point)                 SURGERY RELATED RISK FACTORS  [ ] Acute myocardial infarction                              (1 Point)               [ ]  Section                                             (1 Point)  [ ] Congestive heart failure (in the previous month)  (1 Point)      [ ] Minor surgery                                                  (1 Point)   [ ] Inflammatory bowel disease                             (1 Point)               [ ] Arthroscopic surgery                                        (2 Points)  [ ] Central venous access                                      (2 Points)                [2 ] General surgery lasting more than 45 minutes (2 points)  [ ] Present or previous malignancy                     (2 Points)                [ ] Elective arthroplasty                                         (5 points)    [ ] Stroke (in the previous month)                          (5 Points)                                                                                                                                                           HEMATOLOGY RELATED FACTORS                                                 TRAUMA RELATED RISK FACTORS  [ ] Prior episodes of VTE                                     (3 Points)                [ ] Fracture of the hip, pelvis, or leg                       (5 Points)  [ ] Positive family history for VTE                         (3 Points)             [ ] Acute spinal cord injury (in the previous month)  (5 Points)  [ ] Prothrombin 11313 A                                     (3 Points)               [ ] Paralysis  (less than 1 month)                             (5 Points)  [ ] Factor V Leiden                                             (3 Points)                  [ ] Multiple Trauma within 1 month                        (5 Points)  [ ] Lupus anticoagulants                                     (3 Points)                                                           [ ] Anticardiolipin antibodies                               (3 Points)                                                       [ ] High homocysteine in the blood                      (3 Points)                                             [ ] Other congenital or acquired thrombophilia      (3 Points)                                                [ ] Heparin induced thrombocytopenia                  (3 Points)                                     Total Score [4]

## 2021-09-23 PROBLEM — Z86.69 PERSONAL HISTORY OF OTHER DISEASES OF THE NERVOUS SYSTEM AND SENSE ORGANS: Chronic | Status: ACTIVE | Noted: 2021-09-20

## 2021-09-28 ENCOUNTER — OUTPATIENT (OUTPATIENT)
Dept: OUTPATIENT SERVICES | Facility: HOSPITAL | Age: 66
LOS: 1 days | End: 2021-09-28

## 2021-09-28 DIAGNOSIS — Z98.890 OTHER SPECIFIED POSTPROCEDURAL STATES: Chronic | ICD-10-CM

## 2021-09-28 DIAGNOSIS — Z98.1 ARTHRODESIS STATUS: Chronic | ICD-10-CM

## 2021-09-28 DIAGNOSIS — Z11.52 ENCOUNTER FOR SCREENING FOR COVID-19: ICD-10-CM

## 2021-09-28 DIAGNOSIS — Z98.89 OTHER SPECIFIED POSTPROCEDURAL STATES: Chronic | ICD-10-CM

## 2021-09-28 LAB — SARS-COV-2 RNA SPEC QL NAA+PROBE: SIGNIFICANT CHANGE UP

## 2021-10-01 ENCOUNTER — OUTPATIENT (OUTPATIENT)
Dept: INPATIENT UNIT | Facility: HOSPITAL | Age: 66
LOS: 1 days | End: 2021-10-01
Payer: MEDICARE

## 2021-10-01 ENCOUNTER — APPOINTMENT (OUTPATIENT)
Dept: NEUROSURGERY | Facility: HOSPITAL | Age: 66
End: 2021-10-01
Payer: MEDICARE

## 2021-10-01 VITALS
HEART RATE: 71 BPM | RESPIRATION RATE: 17 BRPM | OXYGEN SATURATION: 97 % | SYSTOLIC BLOOD PRESSURE: 111 MMHG | DIASTOLIC BLOOD PRESSURE: 72 MMHG

## 2021-10-01 VITALS
OXYGEN SATURATION: 100 % | HEIGHT: 65 IN | DIASTOLIC BLOOD PRESSURE: 91 MMHG | RESPIRATION RATE: 15 BRPM | SYSTOLIC BLOOD PRESSURE: 143 MMHG | WEIGHT: 141.98 LBS | TEMPERATURE: 98 F | HEART RATE: 65 BPM

## 2021-10-01 DIAGNOSIS — I86.8 VARICOSE VEINS OF OTHER SPECIFIED SITES: ICD-10-CM

## 2021-10-01 DIAGNOSIS — H93.A9 PULSATILE TINNITUS, UNSPECIFIED EAR: ICD-10-CM

## 2021-10-01 DIAGNOSIS — H93.A2 PULSATILE TINNITUS, LEFT EAR: ICD-10-CM

## 2021-10-01 DIAGNOSIS — Z98.89 OTHER SPECIFIED POSTPROCEDURAL STATES: Chronic | ICD-10-CM

## 2021-10-01 DIAGNOSIS — Z98.1 ARTHRODESIS STATUS: Chronic | ICD-10-CM

## 2021-10-01 DIAGNOSIS — Z98.890 OTHER SPECIFIED POSTPROCEDURAL STATES: Chronic | ICD-10-CM

## 2021-10-01 LAB
BLD GP AB SCN SERPL QL: NEGATIVE — SIGNIFICANT CHANGE UP
RH IG SCN BLD-IMP: NEGATIVE — SIGNIFICANT CHANGE UP

## 2021-10-01 PROCEDURE — C1894: CPT

## 2021-10-01 PROCEDURE — C9803: CPT

## 2021-10-01 PROCEDURE — C1876: CPT

## 2021-10-01 PROCEDURE — C1769: CPT

## 2021-10-01 PROCEDURE — C1760: CPT

## 2021-10-01 PROCEDURE — 86850 RBC ANTIBODY SCREEN: CPT

## 2021-10-01 PROCEDURE — C9399: CPT

## 2021-10-01 PROCEDURE — 87635 SARS-COV-2 COVID-19 AMP PRB: CPT

## 2021-10-01 PROCEDURE — 61624 TCAT PERM OCCLS/EMBOLJ CNS: CPT

## 2021-10-01 PROCEDURE — 75860 VEIN X-RAY NECK: CPT | Mod: 26,59

## 2021-10-01 PROCEDURE — 36012 PLACE CATHETER IN VEIN: CPT | Mod: LT

## 2021-10-01 PROCEDURE — 86901 BLOOD TYPING SEROLOGIC RH(D): CPT

## 2021-10-01 PROCEDURE — 75894 X-RAYS TRANSCATH THERAPY: CPT

## 2021-10-01 PROCEDURE — 75898 FOLLOW-UP ANGIOGRAPHY: CPT | Mod: 26

## 2021-10-01 PROCEDURE — 75898 FOLLOW-UP ANGIOGRAPHY: CPT

## 2021-10-01 PROCEDURE — C1889: CPT

## 2021-10-01 PROCEDURE — 36012 PLACE CATHETER IN VEIN: CPT

## 2021-10-01 PROCEDURE — 75870 VEIN X-RAY SKULL: CPT | Mod: 26,59

## 2021-10-01 PROCEDURE — 75894 X-RAYS TRANSCATH THERAPY: CPT | Mod: 26

## 2021-10-01 PROCEDURE — 86900 BLOOD TYPING SEROLOGIC ABO: CPT

## 2021-10-01 PROCEDURE — C1887: CPT

## 2021-10-01 RX ORDER — HYDROMORPHONE HYDROCHLORIDE 2 MG/ML
0.5 INJECTION INTRAMUSCULAR; INTRAVENOUS; SUBCUTANEOUS
Refills: 0 | Status: DISCONTINUED | OUTPATIENT
Start: 2021-10-01 | End: 2021-10-01

## 2021-10-01 RX ORDER — CHOLECALCIFEROL (VITAMIN D3) 125 MCG
0 CAPSULE ORAL
Qty: 0 | Refills: 0 | DISCHARGE

## 2021-10-01 RX ORDER — CLOPIDOGREL BISULFATE 75 MG/1
75 TABLET, FILM COATED ORAL ONCE
Refills: 0 | Status: COMPLETED | OUTPATIENT
Start: 2021-10-01 | End: 2021-10-01

## 2021-10-01 RX ORDER — BUPROPION HYDROCHLORIDE 150 MG/1
1 TABLET, EXTENDED RELEASE ORAL
Qty: 0 | Refills: 0 | DISCHARGE

## 2021-10-01 RX ORDER — OXYCODONE AND ACETAMINOPHEN 5; 325 MG/1; MG/1
1 TABLET ORAL
Qty: 16 | Refills: 0
Start: 2021-10-01 | End: 2021-10-04

## 2021-10-01 RX ORDER — ASPIRIN/CALCIUM CARB/MAGNESIUM 324 MG
325 TABLET ORAL DAILY
Refills: 0 | Status: DISCONTINUED | OUTPATIENT
Start: 2021-10-01 | End: 2021-10-01

## 2021-10-01 RX ORDER — OMEPRAZOLE 10 MG/1
1 CAPSULE, DELAYED RELEASE ORAL
Qty: 0 | Refills: 0 | DISCHARGE

## 2021-10-01 RX ORDER — SODIUM CHLORIDE 9 MG/ML
1000 INJECTION INTRAMUSCULAR; INTRAVENOUS; SUBCUTANEOUS
Refills: 0 | Status: DISCONTINUED | OUTPATIENT
Start: 2021-10-01 | End: 2021-10-01

## 2021-10-01 RX ORDER — CARBAMAZEPINE 200 MG
0 TABLET ORAL
Qty: 0 | Refills: 0 | DISCHARGE

## 2021-10-01 RX ORDER — ONDANSETRON 8 MG/1
4 TABLET, FILM COATED ORAL ONCE
Refills: 0 | Status: DISCONTINUED | OUTPATIENT
Start: 2021-10-01 | End: 2021-10-01

## 2021-10-01 RX ADMIN — Medication 325 MILLIGRAM(S): at 10:18

## 2021-10-01 RX ADMIN — CLOPIDOGREL BISULFATE 75 MILLIGRAM(S): 75 TABLET, FILM COATED ORAL at 10:18

## 2021-10-01 NOTE — ASU DISCHARGE PLAN (ADULT/PEDIATRIC) - NURSING INSTRUCTIONS
difficulty breathing Please feel free to contact us at [phone] if any problems arise. After 6PM, Monday through Friday, on weekends and on holidays, please call [phone] and ask for the radiology resident on call to be paged.

## 2021-10-01 NOTE — CHART NOTE - NSCHARTNOTEFT_GEN_A_CORE
Interventional Neuro Radiology  Pre-Procedure Note PA-C    This is a 66 year old right female with h/o SLE (last flare x 4 months ago, trigeminal neuralgia, anxiety disorders, previous h/o of covid-19 infection (November 2020, no hospitalization), presents for preop testing for venous sinus stent for pulsatile tinnitus on 10/01/2021 with Dr. Fox. Patient denies any dizziness, no unsteady gait.  patient denies any s/s of covid-19 infection, nor any known exposure, scheduled for covid-19 PCR swab on 9/28/2021 at [facility]. (20 Sep 2021 15:49)      Allergies: No Known Allergies  PMHX: Trigeminal neuralgia, Lupus, COVID-19 virus infection, sciatica      Anxiety disorder    History of tinnitus    S/P spinal fusion  2010  lumbar 3-4    S/P carpal tunnel release  right  2014    H/O brain surgery  for trigeminal neuralgia 2016 shunt placed    History of endoscopy  h/o upper and lower endoscopy 2021  colonoscopy with polypectomy (x 2 polyps benign)    Normal vaginal delivery        Social History:     FAMILY HISTORY:      Current Medications: aspirin 325 milliGRAM(s) Oral daily  clopidogrel Tablet 75 milliGRAM(s) Oral once      Labs:                   Blood Bank:       Assessment/Plan:     This is a 66y  year old right  hand dominant Female  presents with   Patient presents to neuro-IR for selective cerebral angiography.   Procedure, goals, risks, benefits and alternatives  were discussed with patient and patient's family.  All questions were answered.  Risks include but are not limited to stroke, vessel injury, hemorrhage, and or right  groin hematoma.  Patient demonstrates understanding  of all risks involved with this procedure and wishes to continue.   Appropriate  content was obtained from patient and consent is in the patient's chart. Interventional Neuro Radiology  Pre-Procedure Note PA-C    This is a 66 year old right female with h/o SLE (last flare x 4 months ago, trigeminal neuralgia, anxiety disorders, previous h/o of covid-19 infection (November 2020, no hospitalization), presents to Neuro IR for venous sinus stent for pulsatile tinnitus with Dr. Fox.     Upon exam: Patient is A + O x 3, speech is fluent, recent and remote memory intact, follows commands, PERRL, tongue is midline, +facial symmetry, moves all extremities, sensation intact, motor 5/5, ambulates without assist.       Allergies: No Known Allergies  PMHX: Trigeminal neuralgia, Lupus, COVID-19 virus infection, sciatica, Anxiety disorder, left tinnitus,  PSHX: Lumbar fusion, right carpal tunnel release, brain surgery for trigeminal neuralgia 2016 shunt placed, endoscopy, colonoscopy with polypectomy  Social History: , non-tobacco smoker   FAMILY HISTORY: non-contributory   Current Medications: aspirin 325 milliGRAM(s) Oral daily  clopidogrel Tablet 75 milliGRAM(s) Oral once    Covid: not detected   CBC:         14.0  5.43  42.7  213      139   99   12    4.0   25   0.87  97     Blood Bank: O negative     Assessment/Plan:   This is a 66 year old right hand dominant female with left tinnitus, patient presents to Neuro-IR for selective cerebral angiography. Procedure, goals, risks, benefits and alternatives were discussed with patient and patient's . All questions were answered. Risks include but are not limited to stroke, vessel injury, hemorrhage, and or right groin hematoma. Patient demonstrates understanding of all risks involved with this procedure and wishes to continue. Appropriate content was obtained from patient and consent is in the patient's chart.

## 2021-10-01 NOTE — ASU DISCHARGE PLAN (ADULT/PEDIATRIC) - ASU DC SPECIAL INSTRUCTIONSFT
Please take Aspirin and PLavix daily   Please take medrol dose nate    Please do not drive while taking Percocet Please take Aspirin and Plavix daily   Please take medrol dose pack    Please do not drive while taking Percocet

## 2021-10-01 NOTE — ASU DISCHARGE PLAN (ADULT/PEDIATRIC) - CARE PROVIDER_API CALL
Lexy Fox; MPH)  Radiology  [address], [address]  [place] [address]  Phone: [phone]  Fax: [phone]  Follow Up Time:

## 2021-10-01 NOTE — CHART NOTE - NSCHARTNOTEFT_GEN_A_CORE
Interventional Neuro- Radiology   Procedure Note PA-C    Procedure: Catheter Cerebral venogram, cordis precise stent placed in left sigmoid sinus, and coiling of venous aneurysm with 7 coils   Pre- Procedure Diagnosis: left ear tinnitus   Post- Procedure Diagnosis: same     : Dr Ezekiel Cabrales  Physician Assistant: Erinn Pedro PA-C  Resident:                    Dr Sully Spence     Nurse:                        Kwaku Marc RN   Anca Negrete RN  Radiologic Tech:         Derick To LRT,  Aleksandar Laboy LRT   Anesthesiologist:         Dr Negrito Solomon   Sheath:                        8 Colombian short sheath       I/Os: EBL less than 10cc  IV fluids: 450cc Urine due to void  Contrast Omnipaque 240 75cc                Vitals: BP 96/60       HR 64     Spo2 100%          Preliminary Report:  Using a 8 Colombian short sheath to the right femoral vein under general sedation a catheter cerebral venogram, one cordis stent placement in left sigmoid sinus, and 7 coils in venous aneurysm was performed. Official note to follow.  Patient tolerated procedure well, hemodynamically stable, no change in neurological status compared to baseline. Results discussed with patient and patient's .. Right groin sheath was removed, a Vascade device and manual compression held to hemostasis for 10 minutes, by resident.  No active bleeding, no hematoma, quick clot and safeguard balloon dressing applied at 12:30pm. Disposition recovery room.

## 2021-10-01 NOTE — ASU PATIENT PROFILE, ADULT - NSICDXPASTMEDICALHX_GEN_ALL_CORE_FT
PAST MEDICAL HISTORY:  Anxiety disorder     COVID-19 virus infection November 2020  (SOB, treated at home on nebulizer, no hospitalization)    History of sciatica right side mostly    History of tinnitus     Lupus dx x 23 years   last flare 4 months ago    Trigeminal neuralgia

## 2021-10-05 RX ORDER — CLOPIDOGREL BISULFATE 75 MG/1
1 TABLET, FILM COATED ORAL
Qty: 0 | Refills: 0 | DISCHARGE

## 2021-10-05 RX ORDER — ASPIRIN/CALCIUM CARB/MAGNESIUM 324 MG
1 TABLET ORAL
Qty: 0 | Refills: 0 | DISCHARGE

## 2021-10-13 ENCOUNTER — TRANSCRIPTION ENCOUNTER (OUTPATIENT)
Age: 66
End: 2021-10-13

## 2021-10-15 ENCOUNTER — TRANSCRIPTION ENCOUNTER (OUTPATIENT)
Age: 66
End: 2021-10-15

## 2021-10-18 ENCOUNTER — TRANSCRIPTION ENCOUNTER (OUTPATIENT)
Age: 66
End: 2021-10-18

## 2021-10-21 ENCOUNTER — APPOINTMENT (OUTPATIENT)
Dept: NEUROSURGERY | Facility: CLINIC | Age: 66
End: 2021-10-21
Payer: MEDICARE

## 2021-10-21 DIAGNOSIS — H93.A9 PULSATILE TINNITUS, UNSPECIFIED EAR: ICD-10-CM

## 2021-10-21 PROCEDURE — 99213 OFFICE O/P EST LOW 20 MIN: CPT

## 2021-10-28 ENCOUNTER — TRANSCRIPTION ENCOUNTER (OUTPATIENT)
Age: 66
End: 2021-10-28

## 2021-10-28 NOTE — ASSESSMENT
[FreeTextEntry1] : Impression:\par Despite balloon test occlusion that resulted in resolution of pulsatile tinnitus, stent assisted embolization did not have the same effect.  The patient is clearly upset with this outcome but I explained to her that everything was done properly and methodically and my experience of this years the balloon test occlusion essentially predict the outcome of embolization and this is why offered it here.  All questions were answered.  I explained to her neck steps that include evaluation by a neurootologist as well as the routine follow-up after stent assisted embolization of the venous aneurysm.\par \par PLAN\par Continue aspirin and Plavix until October 31.\par Stop Plavix on November 1.\par Continue aspirin until October 1 of 2022 (1 year from treatment).  \par MR venogram with and without contrast on October 1 of 2022 (1 year from treatment).\par Referral to neurootologist.  The case was discussed with Dr. Bolden who will see the patient expeditiously.

## 2021-10-28 NOTE — PHYSICAL EXAM
[General Appearance - Alert] : alert [General Appearance - In No Acute Distress] : in no acute distress [General Appearance - Well-Appearing] : healthy appearing [Oriented To Time, Place, And Person] : oriented to person, place, and time [Person] : oriented to person [Place] : oriented to place [Time] : oriented to time [Sclera] : the sclera and conjunctiva were normal [Outer Ear] : the ears and nose were normal in appearance [Neck Appearance] : the appearance of the neck was normal [] : no respiratory distress [Heart Rate And Rhythm] : heart rate was normal and rhythm regular [Abnormal Walk] : normal gait [Skin Color & Pigmentation] : normal skin color and pigmentation [FreeTextEntry1] : Right Groin Puncture site - BLAIRE, Clean/dry/intact, no tenderness, no ecchymosis, no drainage

## 2021-10-28 NOTE — HISTORY OF PRESENT ILLNESS
[FreeTextEntry1] : Ms. SMITH is a pleasant 65yo female who presents today after successful stent -assisted left sigmoid venous aneurysm embolization on 10/1/21.  The patient was went home the same day in good condition.  When she woke up in the recovery room she noted the the pulsatile tinnitus was improved.  She states that when she woke up the following morning the pulsatile tinnitus was present and the same as pre procedure.  She states the recovery room was noisy and after anesthesia she probably was not accurate in her description of pulsatile tinnitus improving.  Her puncture sites are healing well, not painful.  Denies headaches, dizziness, visual scintillations, episodes of visual loss or blurring, diplopia, hearing changes, tinnitus, speech problems, swallowing difficulties, numbness, tingling, weakness, tremors, twitches, seizures, imbalance or coordination difficulties\par \par \par She remains compliant with ASA 325mg and Plavix 75mg daily.\par

## 2021-11-30 ENCOUNTER — APPOINTMENT (OUTPATIENT)
Dept: OTOLARYNGOLOGY | Facility: CLINIC | Age: 66
End: 2021-11-30
Payer: MEDICARE

## 2021-11-30 VITALS
BODY MASS INDEX: 23.9 KG/M2 | DIASTOLIC BLOOD PRESSURE: 75 MMHG | WEIGHT: 140 LBS | HEIGHT: 64 IN | HEART RATE: 60 BPM | SYSTOLIC BLOOD PRESSURE: 129 MMHG

## 2021-11-30 DIAGNOSIS — M26.623 ARTHRALGIA OF BILATERAL TEMPOROMANDIBULAR JOINT: ICD-10-CM

## 2021-11-30 DIAGNOSIS — H90.5 UNSPECIFIED SENSORINEURAL HEARING LOSS: ICD-10-CM

## 2021-11-30 DIAGNOSIS — H93.13 TINNITUS, BILATERAL: ICD-10-CM

## 2021-11-30 PROCEDURE — 92557 COMPREHENSIVE HEARING TEST: CPT

## 2021-11-30 PROCEDURE — 92625 TINNITUS ASSESSMENT: CPT

## 2021-11-30 PROCEDURE — 92567 TYMPANOMETRY: CPT

## 2021-11-30 PROCEDURE — 92504 EAR MICROSCOPY EXAMINATION: CPT

## 2021-11-30 PROCEDURE — 99204 OFFICE O/P NEW MOD 45 MIN: CPT | Mod: 25

## 2021-11-30 RX ORDER — CLOPIDOGREL BISULFATE 75 MG/1
75 TABLET, FILM COATED ORAL DAILY
Qty: 40 | Refills: 0 | Status: DISCONTINUED | COMMUNITY
Start: 2021-08-10 | End: 2021-11-30

## 2021-11-30 RX ORDER — CARBAMAZEPINE 200 MG/1
TABLET ORAL
Refills: 0 | Status: ACTIVE | COMMUNITY

## 2021-11-30 RX ORDER — PANTOPRAZOLE 40 MG/1
40 TABLET, DELAYED RELEASE ORAL DAILY
Qty: 30 | Refills: 6 | Status: DISCONTINUED | COMMUNITY
Start: 2021-09-28 | End: 2021-11-30

## 2021-11-30 RX ORDER — OMEPRAZOLE 20 MG/1
TABLET, DELAYED RELEASE ORAL
Refills: 0 | Status: ACTIVE | COMMUNITY

## 2021-12-01 NOTE — PROCEDURE
[Other ___] : [unfilled] [Same] : same as the Pre Op Dx. [] : Binocular Microscopy [FreeTextEntry1] : tinnitus [FreeTextEntry4] : none [FreeTextEntry6] : Operative microscope was used to examine the ear canal, ear drum and visible middle ear landmarks. Adequate exam would not have been possible without the use of a microscope. Findings are described.\par \par

## 2021-12-01 NOTE — PHYSICAL EXAM
[Binocular Microscopic Exam] : Binocular microscopic exam was performed [Midline] : trachea located in midline position [Normal] : no rashes [de-identified] : +TTP bridget [Hearing Loss Right Only] : normal [Hearing Loss Left Only] : normal

## 2021-12-01 NOTE — DATA REVIEWED
[de-identified] : A pure tone audiogram with speech discrimination and tympanometry was ordered and performed due to patient's complaint of tinnitus and no recent/available audiogram performed to measure changes/establish a cause at our facility or elsewhere.\par I have independently reviewed the patient's audiogram from today and my findings include essentially normal hearing with HF SNHL on L side at 4k\par tinnitus pitch matches at 8k, 70dB

## 2022-01-17 ENCOUNTER — TRANSCRIPTION ENCOUNTER (OUTPATIENT)
Age: 67
End: 2022-01-17

## 2022-03-02 ENCOUNTER — APPOINTMENT (OUTPATIENT)
Dept: OTOLARYNGOLOGY | Facility: CLINIC | Age: 67
End: 2022-03-02

## 2022-03-18 NOTE — H&P PST ADULT - CIGARETTES, NUMBER OF YRS
-- DO NOT REPLY / DO NOT REPLY ALL --  -- Message is from the [facility]--    General Patient Message      Reason for Call: Patients daughter called in to see if there was another doctor her father could be recommended to for referral # [id number] as this doctor does not have any availability until 10/06. Please call her to advise. Thank you     Caller Information       Type Contact Phone    03/18/2022 02:53 PM CDT Phone (Incoming) KULWINDER WASHINGTON (Emergency Contact) [phone]          Alternative phone number: [phone]    Turnaround time given to caller:   \"This message will be sent to [state Provider's name]. The clinical team will fulfill your request as soon as they review your message.\"     20

## 2022-04-13 ENCOUNTER — APPOINTMENT (OUTPATIENT)
Dept: OTOLARYNGOLOGY | Facility: CLINIC | Age: 67
End: 2022-04-13

## 2022-10-21 ENCOUNTER — EMERGENCY (EMERGENCY)
Facility: HOSPITAL | Age: 67
LOS: 0 days | Discharge: HOME | End: 2022-10-22
Attending: EMERGENCY MEDICINE
Payer: MEDICARE

## 2022-10-21 VITALS — HEIGHT: 65 IN | WEIGHT: 130.07 LBS
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
## 2022-10-21 DIAGNOSIS — M54.50 LOW BACK PAIN, UNSPECIFIED: ICD-10-CM

## 2022-10-21 DIAGNOSIS — Z79.02 LONG TERM (CURRENT) USE OF ANTITHROMBOTICS/ANTIPLATELETS: ICD-10-CM

## 2022-10-21 DIAGNOSIS — G50.0 TRIGEMINAL NEURALGIA: ICD-10-CM

## 2022-10-21 DIAGNOSIS — Z91.14 PATIENT'S OTHER NONCOMPLIANCE WITH MEDICATION REGIMEN: ICD-10-CM

## 2022-10-21 DIAGNOSIS — M54.9 DORSALGIA, UNSPECIFIED: ICD-10-CM

## 2022-10-21 DIAGNOSIS — Z86.16 PERSONAL HISTORY OF COVID-19: ICD-10-CM

## 2022-10-21 DIAGNOSIS — Z79.82 LONG TERM (CURRENT) USE OF ASPIRIN: ICD-10-CM

## 2022-10-21 DIAGNOSIS — T40.2X6A UNDERDOSING OF OTHER OPIOIDS, INITIAL ENCOUNTER: ICD-10-CM

## 2022-10-21 DIAGNOSIS — R10.30 LOWER ABDOMINAL PAIN, UNSPECIFIED: ICD-10-CM
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
## 2022-10-21 DIAGNOSIS — M32.9 SYSTEMIC LUPUS ERYTHEMATOSUS, UNSPECIFIED: ICD-10-CM

## 2022-10-21 DIAGNOSIS — T81.89XA OTHER COMPLICATIONS OF PROCEDURES, NOT ELSEWHERE CLASSIFIED, INITIAL ENCOUNTER: ICD-10-CM

## 2022-10-21 DIAGNOSIS — R14.3 FLATULENCE: ICD-10-CM

## 2022-10-21 DIAGNOSIS — Z98.1 ARTHRODESIS STATUS: Chronic | ICD-10-CM

## 2022-10-21 DIAGNOSIS — Z98.890 OTHER SPECIFIED POSTPROCEDURAL STATES: Chronic | ICD-10-CM

## 2022-10-21 DIAGNOSIS — K59.00 CONSTIPATION, UNSPECIFIED: ICD-10-CM

## 2022-10-21 DIAGNOSIS — Z20.822 CONTACT WITH AND (SUSPECTED) EXPOSURE TO COVID-19: ICD-10-CM

## 2022-10-21 DIAGNOSIS — Z98.89 OTHER SPECIFIED POSTPROCEDURAL STATES: Chronic | ICD-10-CM

## 2022-10-21 LAB
ALBUMIN SERPL ELPH-MCNC: 3.8 G/DL — SIGNIFICANT CHANGE UP (ref 3.3–5)
ALP SERPL-CCNC: 51 U/L — SIGNIFICANT CHANGE UP (ref 40–120)
ALT FLD-CCNC: 20 U/L — SIGNIFICANT CHANGE UP (ref 12–78)
ANION GAP SERPL CALC-SCNC: 7 MMOL/L — SIGNIFICANT CHANGE UP (ref 5–17)
APTT BLD: 33.2 SEC — SIGNIFICANT CHANGE UP (ref 27.5–35.5)
AST SERPL-CCNC: 20 U/L — SIGNIFICANT CHANGE UP (ref 15–37)
BASOPHILS # BLD AUTO: 0.03 K/UL — SIGNIFICANT CHANGE UP (ref 0–0.2)
BASOPHILS NFR BLD AUTO: 0.4 % — SIGNIFICANT CHANGE UP (ref 0–2)
BILIRUB SERPL-MCNC: 0.3 MG/DL — SIGNIFICANT CHANGE UP (ref 0.2–1.2)
BUN SERPL-MCNC: 12 MG/DL — SIGNIFICANT CHANGE UP (ref 7–23)
CALCIUM SERPL-MCNC: 9.2 MG/DL — SIGNIFICANT CHANGE UP (ref 8.5–10.1)
CHLORIDE SERPL-SCNC: 97 MMOL/L — SIGNIFICANT CHANGE UP (ref 96–108)
CO2 SERPL-SCNC: 27 MMOL/L — SIGNIFICANT CHANGE UP (ref 22–31)
CREAT SERPL-MCNC: 0.84 MG/DL — SIGNIFICANT CHANGE UP (ref 0.5–1.3)
EGFR: 76 ML/MIN/1.73M2 — SIGNIFICANT CHANGE UP
EOSINOPHIL # BLD AUTO: 0.12 K/UL — SIGNIFICANT CHANGE UP (ref 0–0.5)
EOSINOPHIL NFR BLD AUTO: 1.5 % — SIGNIFICANT CHANGE UP (ref 0–6)
FLUAV AG NPH QL: SIGNIFICANT CHANGE UP
FLUBV AG NPH QL: SIGNIFICANT CHANGE UP
GLUCOSE SERPL-MCNC: 105 MG/DL — HIGH (ref 70–99)
HCT VFR BLD CALC: 33.6 % — LOW (ref 34.5–45)
HGB BLD-MCNC: 11.4 G/DL — LOW (ref 11.5–15.5)
IMM GRANULOCYTES NFR BLD AUTO: 1 % — HIGH (ref 0–0.9)
INR BLD: 1.13 RATIO — SIGNIFICANT CHANGE UP (ref 0.88–1.16)
LACTATE SERPL-SCNC: 0.9 MMOL/L — SIGNIFICANT CHANGE UP (ref 0.7–2)
LIDOCAIN IGE QN: 184 U/L — SIGNIFICANT CHANGE UP (ref 73–393)
LYMPHOCYTES # BLD AUTO: 2.25 K/UL — SIGNIFICANT CHANGE UP (ref 1–3.3)
LYMPHOCYTES # BLD AUTO: 28.2 % — SIGNIFICANT CHANGE UP (ref 13–44)
MAGNESIUM SERPL-MCNC: 2 MG/DL — SIGNIFICANT CHANGE UP (ref 1.6–2.6)
MCHC RBC-ENTMCNC: 32.9 PG — SIGNIFICANT CHANGE UP (ref 27–34)
MCHC RBC-ENTMCNC: 33.9 GM/DL — SIGNIFICANT CHANGE UP (ref 32–36)
MCV RBC AUTO: 96.8 FL — SIGNIFICANT CHANGE UP (ref 80–100)
MONOCYTES # BLD AUTO: 1.08 K/UL — HIGH (ref 0–0.9)
MONOCYTES NFR BLD AUTO: 13.5 % — SIGNIFICANT CHANGE UP (ref 2–14)
NEUTROPHILS # BLD AUTO: 4.42 K/UL — SIGNIFICANT CHANGE UP (ref 1.8–7.4)
NEUTROPHILS NFR BLD AUTO: 55.4 % — SIGNIFICANT CHANGE UP (ref 43–77)
PLATELET # BLD AUTO: 302 K/UL — SIGNIFICANT CHANGE UP (ref 150–400)
POTASSIUM SERPL-MCNC: 4.1 MMOL/L — SIGNIFICANT CHANGE UP (ref 3.5–5.3)
POTASSIUM SERPL-SCNC: 4.1 MMOL/L — SIGNIFICANT CHANGE UP (ref 3.5–5.3)
PROT SERPL-MCNC: 7.2 GM/DL — SIGNIFICANT CHANGE UP (ref 6–8.3)
PROTHROM AB SERPL-ACNC: 13.1 SEC — SIGNIFICANT CHANGE UP (ref 10.5–13.4)
RBC # BLD: 3.47 M/UL — LOW (ref 3.8–5.2)
RBC # FLD: 11.9 % — SIGNIFICANT CHANGE UP (ref 10.3–14.5)
RSV RNA NPH QL NAA+NON-PROBE: SIGNIFICANT CHANGE UP
SARS-COV-2 RNA SPEC QL NAA+PROBE: SIGNIFICANT CHANGE UP
SODIUM SERPL-SCNC: 131 MMOL/L — LOW (ref 135–145)
WBC # BLD: 7.98 K/UL — SIGNIFICANT CHANGE UP (ref 3.8–10.5)
WBC # FLD AUTO: 7.98 K/UL — SIGNIFICANT CHANGE UP (ref 3.8–10.5)

## 2022-10-21 PROCEDURE — 83735 ASSAY OF MAGNESIUM: CPT

## 2022-10-21 PROCEDURE — 85610 PROTHROMBIN TIME: CPT

## 2022-10-21 PROCEDURE — 74177 CT ABD & PELVIS W/CONTRAST: CPT | Mod: MA

## 2022-10-21 PROCEDURE — 83605 ASSAY OF LACTIC ACID: CPT

## 2022-10-21 PROCEDURE — 86900 BLOOD TYPING SEROLOGIC ABO: CPT

## 2022-10-21 PROCEDURE — 36415 COLL VENOUS BLD VENIPUNCTURE: CPT

## 2022-10-21 PROCEDURE — 96375 TX/PRO/DX INJ NEW DRUG ADDON: CPT

## 2022-10-21 PROCEDURE — 85025 COMPLETE CBC W/AUTO DIFF WBC: CPT

## 2022-10-21 PROCEDURE — 85730 THROMBOPLASTIN TIME PARTIAL: CPT

## 2022-10-21 PROCEDURE — 99284 EMERGENCY DEPT VISIT MOD MDM: CPT | Mod: 25

## 2022-10-21 PROCEDURE — 83690 ASSAY OF LIPASE: CPT

## 2022-10-21 PROCEDURE — 0241U: CPT

## 2022-10-21 PROCEDURE — 86901 BLOOD TYPING SEROLOGIC RH(D): CPT

## 2022-10-21 PROCEDURE — 99284 EMERGENCY DEPT VISIT MOD MDM: CPT

## 2022-10-21 PROCEDURE — 80053 COMPREHEN METABOLIC PANEL: CPT

## 2022-10-21 PROCEDURE — 96374 THER/PROPH/DIAG INJ IV PUSH: CPT

## 2022-10-21 PROCEDURE — 86850 RBC ANTIBODY SCREEN: CPT

## 2022-10-21 RX ORDER — ONDANSETRON 8 MG/1
4 TABLET, FILM COATED ORAL ONCE
Refills: 0 | Status: COMPLETED | OUTPATIENT
Start: 2022-10-21 | End: 2022-10-21

## 2022-10-21 RX ORDER — SODIUM CHLORIDE 9 MG/ML
1000 INJECTION INTRAMUSCULAR; INTRAVENOUS; SUBCUTANEOUS ONCE
Refills: 0 | Status: COMPLETED | OUTPATIENT
Start: 2022-10-21 | End: 2022-10-21

## 2022-10-21 RX ADMIN — ONDANSETRON 4 MILLIGRAM(S): 8 TABLET, FILM COATED ORAL at 22:19

## 2022-10-21 RX ADMIN — SODIUM CHLORIDE 1000 MILLILITER(S): 9 INJECTION INTRAMUSCULAR; INTRAVENOUS; SUBCUTANEOUS at 22:19

## 2022-10-21 NOTE — ED PROVIDER NOTE - CLINICAL SUMMARY MEDICAL DECISION MAKING FREE TEXT BOX
Differential diagnosis includes postop ileus versus small bowel obstruction versus fecal impaction.  We will get labs and CT of the abdomen pelvis with oral contrast to rule out obstruction.  Will attempt medical disimpaction if there is no evidence of obstruction.

## 2022-10-21 NOTE — ED STATDOCS - CLINICAL SUMMARY MEDICAL DECISION MAKING FREE TEXT BOX
Stoney Shah for Dr. Coburn     67 year old female with PMHx of anxiety, sciatica, tinnitus, lupus, and trigeminal neuralgia presents to the ED complaining of constipation and generalized abdominal discomfort s/p spinal fusion on 10/13 at [facility]. Pt has been taking Oxycodone, Flexeril and Tylenol after surgery and since has been unable to have a BM. Pt notes she has been able to pass gas. Pt has been trying to use Colace, Miralax and Enemas, all without relief. Denies any fevers, vomiting, or urinary symptoms. Pt went to urgent care and had an X-ray and was referred here. Will send to [facility] for further evaluation.

## 2022-10-21 NOTE — ED ADULT NURSE NOTE - OBJECTIVE STATEMENT
pt presents to the ED with abd pain and nausea for 2 days. pt had recent back surgery at [facility] and has not moved her bowels since. pt reports abd cramping and nausea, denies vomiting. +gas post op but has not passed gas in the last 2 days

## 2022-10-21 NOTE — ED PROVIDER NOTE - NSFOLLOWUPINSTRUCTIONS_ED_ALL_ED_FT
Constipation, Adult    Constipation is when a person has fewer than three bowel movements in a week, has difficulty having a bowel movement, or has stools (feces) that are dry, hard, or larger than normal. Constipation may be caused by an underlying condition. It may become worse with age if a person takes certain medicines and does not take in enough fluids.    Follow these instructions at home:      Eating and drinking     Eat foods that have a lot of fiber, such as beans, whole grains, and fresh fruits and vegetables.  Limit foods that are low in fiber and high in fat and processed sugars, such as fried or sweet foods. These include french fries, hamburgers, cookies, candies, and soda.  Drink enough fluid to keep your urine pale yellow.        General instructions    Exercise regularly or as told by your health care provider. Try to do 150 minutes of moderate exercise each week.  Use the bathroom when you have the urge to go. Do not hold it in.  Take over-the-counter and prescription medicines only as told by your health care provider. This includes any fiber supplements.  During bowel movements:     Practice deep breathing while relaxing the lower abdomen.  Practice pelvic floor relaxation.  Watch your condition for any changes. Let your health care provider know about them.  Keep all follow-up visits as told by your health care provider. This is important.    Contact a health care provider if:  You have pain that gets worse.  You have a fever.  You do not have a bowel movement after 4 days.  You vomit.  You are not hungry or you lose weight.  You are bleeding from the opening between the buttocks (anus).  You have thin, pencil-like stools.    Get help right away if:  You have a fever and your symptoms suddenly get worse.  You leak stool or have blood in your stool.  Your abdomen is bloated.  You have severe pain in your abdomen.  You feel dizzy or you faint.    Summary  Constipation is when a person has fewer than three bowel movements in a week, has difficulty having a bowel movement, or has stools (feces) that are dry, hard, or larger than normal.  Eat foods that have a lot of fiber, such as beans, whole grains, and fresh fruits and vegetables.  Drink enough fluid to keep your urine pale yellow.  Take over-the-counter and prescription medicines only as told by your health care provider. This includes any fiber supplements.    ADDITIONAL NOTES AND INSTRUCTIONS    Please follow up with your Primary MD in 24-48 hr.  Seek immediate medical care for any new/worsening signs or symptoms.

## 2022-10-21 NOTE — ED PROVIDER NOTE - OBJECTIVE STATEMENT
67-year-old female with history of lupus on hydroxychloroquine, trigeminal neuralgia, recent lumbar fusion [facility] October 13 presents with low abdominal and back pain and no bowel movement since the surgery.  Patient states that she has been taking oxycodone 3 times a day and Flexeril for pain.  Patient has not taken oxycodone in the last 24 hours.  Patient has attempted to have a bowel movement after taking MiraLAX, suppository and fleets enema without any relief.  Patient notes that she was passing gas but not over the past 24 hours.  She was seen at urgent care and they did an abdominal x-ray for further sensor to the emergency department to rule out small bowel obstruction versus ileus.

## 2022-10-21 NOTE — ED ADULT TRIAGE NOTE - CHIEF COMPLAINT QUOTE
patient bib wheelchair to triage c/o abd distension and no bowel movement x9 days. 9 days ago patient had L2-L3 fusion. paitnet has been taking oxy/flexeril for pain. -vomiting/nausea. pmh of lupus, RA. -allergies

## 2022-10-21 NOTE — ED STATDOCS - PHYSICAL EXAMINATION
General: AAOx3, NAD  HEENT: NCAT  Cardiac: Normal rate and rhythym, no murmurs, normal peripheral perfusion  Respiratory: Normal rate and effort. CTAB  GI: Soft, nondistended, nontender  Neuro: No focal deficits. SILVA equally x4, sensation to light touch intact throughout  MSK: FROMx4, no focal bony tenderness, no peripheral edema  Skin: No rash

## 2022-10-21 NOTE — ED PROVIDER NOTE - PATIENT PORTAL LINK FT
You can access the FollowMyHealth Patient Portal offered by [facility] by registering at the following website: [email or web address]. By joining [facility]’s FollowMyHealth portal, you will also be able to view your health information using other applications (apps) compatible with our system.

## 2022-10-21 NOTE — ED ADULT NURSE NOTE - CCCP TRG CHIEF CMPLNT
abdominal pain Quality 130: Documentation Of Current Medications In The Medical Record: Current Medications Documented Quality 431: Preventive Care And Screening: Unhealthy Alcohol Use - Screening: Patient not identified as an unhealthy alcohol user when screened for unhealthy alcohol use using a systematic screening method Quality 337: Tuberculosis Prevention For Psoriasis And Psoriatic Arthritis Patients On A Biological Immune Response Modifier: Patient has a documented negative TB screening prior to treatment. Quality 226: Preventive Care And Screening: Tobacco Use: Screening And Cessation Intervention: Patient screened for tobacco use and is an ex/non-smoker Detail Level: Detailed

## 2022-10-21 NOTE — ED ADULT NURSE NOTE - NS ED NOTE ABUSE RESPONSE YN
[FreeTextEntry1] : 59 year old with leg numbness. An arterial duplex shows no evidence of hemodynamically significant disease. This appear to be neuropathy. I suggested that he see a neurologist for evaluation.  Yes

## 2022-10-21 NOTE — ED PROVIDER NOTE - PROGRESS NOTE DETAILS
Results of CT scan reviewed with the patient.  Patient is made aware of the postop fluid collections and will follow up with her spine surgeon.  On rectal exam, the patient did not have a fecal impaction.  Will provide patient a prescription for GoLytely and advised to drink plenty of fluids to stay hydrated.

## 2022-10-22 VITALS
RESPIRATION RATE: 18 BRPM | SYSTOLIC BLOOD PRESSURE: 99 MMHG | OXYGEN SATURATION: 96 % | TEMPERATURE: 98 F | HEART RATE: 66 BPM | DIASTOLIC BLOOD PRESSURE: 66 MMHG

## 2022-10-22 PROCEDURE — 74177 CT ABD & PELVIS W/CONTRAST: CPT | Mod: 26,MA

## 2022-10-22 RX ORDER — ACETAMINOPHEN 500 MG
650 TABLET ORAL ONCE
Refills: 0 | Status: COMPLETED | OUTPATIENT
Start: 2022-10-22 | End: 2022-10-22

## 2022-10-22 RX ORDER — SOD SULF/SODIUM/NAHCO3/KCL/PEG
1 SOLUTION, RECONSTITUTED, ORAL ORAL
Qty: 1 | Refills: 0
Start: 2022-10-22 | End: 2022-10-22

## 2022-10-22 RX ADMIN — Medication 650 MILLIGRAM(S): at 01:52

## 2023-01-05 ENCOUNTER — NON-APPOINTMENT (OUTPATIENT)
Age: 68
End: 2023-01-05

## 2023-01-09 ENCOUNTER — APPOINTMENT (OUTPATIENT)
Dept: NEUROSURGERY | Facility: CLINIC | Age: 68
End: 2023-01-09
Payer: MEDICARE

## 2023-01-09 VITALS
WEIGHT: 131 LBS | OXYGEN SATURATION: 97 % | DIASTOLIC BLOOD PRESSURE: 83 MMHG | HEIGHT: 64 IN | HEART RATE: 75 BPM | SYSTOLIC BLOOD PRESSURE: 120 MMHG | TEMPERATURE: 97.9 F | BODY MASS INDEX: 22.36 KG/M2

## 2023-01-09 PROCEDURE — 99215 OFFICE O/P EST HI 40 MIN: CPT

## 2023-01-19 ENCOUNTER — OUTPATIENT (OUTPATIENT)
Dept: OUTPATIENT SERVICES | Facility: HOSPITAL | Age: 68
LOS: 1 days | End: 2023-01-19
Payer: MEDICARE

## 2023-01-19 ENCOUNTER — APPOINTMENT (OUTPATIENT)
Dept: MRI IMAGING | Facility: CLINIC | Age: 68
End: 2023-01-19
Payer: MEDICARE

## 2023-01-19 ENCOUNTER — APPOINTMENT (OUTPATIENT)
Dept: RADIOLOGY | Facility: CLINIC | Age: 68
End: 2023-01-19
Payer: MEDICARE

## 2023-01-19 DIAGNOSIS — Z98.1 ARTHRODESIS STATUS: Chronic | ICD-10-CM

## 2023-01-19 DIAGNOSIS — Z98.89 OTHER SPECIFIED POSTPROCEDURAL STATES: Chronic | ICD-10-CM

## 2023-01-19 DIAGNOSIS — Z98.890 OTHER SPECIFIED POSTPROCEDURAL STATES: Chronic | ICD-10-CM

## 2023-01-19 DIAGNOSIS — Z98.1 ARTHRODESIS STATUS: ICD-10-CM

## 2023-01-19 PROCEDURE — 72158 MRI LUMBAR SPINE W/O & W/DYE: CPT | Mod: 26,MH

## 2023-01-19 PROCEDURE — 72158 MRI LUMBAR SPINE W/O & W/DYE: CPT

## 2023-01-19 PROCEDURE — 72110 X-RAY EXAM L-2 SPINE 4/>VWS: CPT | Mod: 26,59

## 2023-01-19 PROCEDURE — 72082 X-RAY EXAM ENTIRE SPI 2/3 VW: CPT | Mod: 26

## 2023-01-19 PROCEDURE — 72084 X-RAY EXAM ENTIRE SPI 6/> VW: CPT

## 2023-01-19 PROCEDURE — 72082 X-RAY EXAM ENTIRE SPI 2/3 VW: CPT

## 2023-01-19 PROCEDURE — A9585: CPT

## 2023-01-19 PROCEDURE — 72110 X-RAY EXAM L-2 SPINE 4/>VWS: CPT

## 2023-01-23 ENCOUNTER — APPOINTMENT (OUTPATIENT)
Dept: RADIOLOGY | Facility: CLINIC | Age: 68
End: 2023-01-23
Payer: MEDICARE

## 2023-01-23 ENCOUNTER — OUTPATIENT (OUTPATIENT)
Dept: OUTPATIENT SERVICES | Facility: HOSPITAL | Age: 68
LOS: 1 days | End: 2023-01-23
Payer: MEDICARE

## 2023-01-23 DIAGNOSIS — Z98.1 ARTHRODESIS STATUS: ICD-10-CM

## 2023-01-23 PROCEDURE — 77085 DXA BONE DENSITY AXL VRT FX: CPT | Mod: 26

## 2023-01-23 PROCEDURE — 77085 DXA BONE DENSITY AXL VRT FX: CPT

## 2023-01-30 NOTE — REVIEW OF SYSTEMS
[Anxiety] : anxiety [Joint Pain] : joint pain [Negative] : Heme/Lymph [FreeTextEntry9] : Muscle Pain

## 2023-01-30 NOTE — CONSULT LETTER
[Dear  ___] : Dear  [unfilled], [Courtesy Letter:] : I had the pleasure of seeing your patient, [unfilled], in my office today. [Sincerely,] : Sincerely, [FreeTextEntry2] : Mike Rainey MD\par [address]  [address]\[place] [address] [FreeTextEntry1] : Mrs. Mariano is a very pleasant 67-year-old female patient who was seen in our office today in regards to low back and left-sided leg pain.  The patient is a somewhat difficult historian but the patient did bring her daughter with her today at this visit to help with history.\par \par The patient endorses an approximate 8-month history of left-sided leg pain.  The patient believes that this occurred after her most recent surgical intervention but states that she definitely had left-sided leg symptoms prior to her surgical intervention.  The patient has difficulty remembering her the conditions prior to her initial spine surgery in 2010 but believes that she only had axial back pain at that time.  The patient underwent an L4/5 posterolateral and instrumented decompression and fusion which she states provided good results.  The patient states that he did not have any significant issues with her first surgery for several years.  Most recently, the patient began developing left-sided lower extremity issues including pain in the hip with her left leg "giving out" in March 2022.  The patient was having difficulty climbing stairs and walking uphill as a result of these issues.  The patient was treated conservatively and assessed by both pain management and orthopedic surgeons for symptomatic relief and hip pathology respectively.  Unfortunately, none of these treatment modalities or investigations provided relief or specific diagnoses.  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  The patient subsequently underwent another operative intervention in October 2022 in the form of a L3/4 posterior lateral and instrumented fusion.  Unfortunately, the patient states that her pain did not improve and actually worsened after this procedure.  The patient states that her pain previously resided in the upper part of her leg and now encompasses the entire length of her leg to the ankle.  The patient states that she does have intermittent mild improvement with hip flexion but this is short-lived.  Currently, the patient's symptoms are worse with standing and slightly improved with recumbency.  However, the patient's pain is constant.  At rest, the patient's pain below the knee is worse.  The patient currently Tylenol as needed for her pain but also takes gabapentin and Lyrica for lupus, rheumatoid arthritis, and fibromyalgia.  \par \par The patient's past medical history is significant for osteoporosis, rheumatoid arthritis, fibromyalgia, anxiety, lupus, trigeminal neuralgia, and pulsatile tinnitus.  The patient's current medical regimen includes Plaquenil, gabapentin, Wellbutrin, omeprazole, and Lyrica.  The patient does not endorse allergies to medications.\par \par On examination, the patient is alert, oriented, and compliant with the exam.  The patient demonstrates full strength in the right lower extremity.  On the left, the patient demonstrates 4/5 strength with hip flexion and dorsiflexion of the ankle.  The patient demonstrates 5/5 strength elsewhere in the left leg.  There is a component of effort and possible giveaway weakness secondary to pain.  The patient demonstrates 2+ reflexes at the patella and 1+ reflexes at the Achilles tendons.  The patient ambulates with an antalgic gait.  The patient denies any significant paresthesias or loss of sensation.\par \par The patient is accompanied with an MRI scan of the lumbar spine performed on September 27, 2022 prior to her most recent surgery.  These images demonstrate central and lateral recess stenosis with bilateral foraminal stenosis at L3/4 likely as a result of adjacent segment degeneration.  The patient also has a retrolisthesis of L3/4.  The patient is also accompanied with a CT scan of the lumbar spine performed on December 9, 2022 after her most recent surgical intervention which demonstrates adequate placement of the pedicle screws.  A unilateral pars defect is noted at L2.  Severe foraminal stenosis is noted on the right at L3/4, but not on the left.\par \par Taken together, the patient has a clinical history and physical examination most consistent with an L5 radiculopathy on the left.  I explained to the patient I do not have a specific diagnosis for this pain at the moment.  There are several possible causes related to her recent surgical invention and causes unrelated to her surgical intervention which we went over in detail today.  However, I counseled the patient there is currently no clear forerunner.  At this time I have recommended additional testing including EMG/nerve conduction studies, an updated MRI scan of the lumbar spine, and several x-rays of the spine to better understand the patient's anatomy.  I do not have any specific treatment plan given the uncertainty of the patient's diagnosis and thus have referred the patient back to her pain management physicians for symptomatic relief at this time.  I have recommended follow-up with us once her imaging and tests are complete so that we can review these findings together and hopefully develop a more specific treatment plan going forward. [FreeTextEntry3] : Norris Panchal MD, PhD, FRCPSC \par Attending Neurosurgeon \par  of Neurosurgery \par [facility] \par [address], [address]nd floor \[place] [address] \par Office: [phone] \par Fax: [phone]\par \par

## 2023-01-31 ENCOUNTER — APPOINTMENT (OUTPATIENT)
Dept: NEUROSURGERY | Facility: CLINIC | Age: 68
End: 2023-01-31
Payer: MEDICARE

## 2023-01-31 VITALS
HEART RATE: 80 BPM | OXYGEN SATURATION: 100 % | TEMPERATURE: 97.8 F | DIASTOLIC BLOOD PRESSURE: 82 MMHG | SYSTOLIC BLOOD PRESSURE: 136 MMHG

## 2023-01-31 DIAGNOSIS — M25.552 PAIN IN LEFT HIP: ICD-10-CM

## 2023-01-31 PROCEDURE — 99215 OFFICE O/P EST HI 40 MIN: CPT

## 2023-01-31 NOTE — CONSULT LETTER
[Dear  ___] : Dear  [unfilled], [Courtesy Letter:] : I had the pleasure of seeing your patient, [unfilled], in my office today. [Sincerely,] : Sincerely, [FreeTextEntry2] : Mike Rainey  [address] [place] [address]   [FreeTextEntry1] : Mrs. Mariano is a very pleasant 67-year-old female patient who was seen in our office today to review imaging findings.\par \par Briefly, the patient has been experiencing ongoing left-sided leg pain which worsened after her most recent surgical intervention elsewhere.  The patient's pain continues to radiate down the length of her left leg and is most consistent with an L5 dermatome.  The patient was organized several postoperative images which were reviewed today.  I am happy to report that the patient's back pain has improved, but the patient's left-sided leg symptoms continue to be bothersome.\par \par On examination, the patient remains alert, oriented, and compliant with the exam.  The patient continues demonstrate full strength in the right lower extremity.  The patient continues to demonstrate full strength in the left lower extremity except with hip flexion and ankle dorsiflexion which is rated at 4/5.  The patient continues to ambulate with an antalgic gait.\par \par The patient is accompanied with a bone density scan from January 23, 2023 which demonstrates osteoporosis in the femoral neck.  The patient's spine bone density appears normal.  The patient is additionally accompanied with an MRI scan of the lumbar spine dated January 24, 2023.  These images demonstrate instrumentation across L3 and 4 without evidence of hardware complication.  The patient appears decompressed at this level.  The patient is additionally accompanied with flexion/extension x-rays performed on January 19, 2023 which does not demonstrate any evidence of hardware failure or malfunction.  Finally, the patient is accompanied with standing scoliosis x-rays performed on January 19, 2023 which demonstrates a mild dextroscoliosis of the lumbar spine which is compensated.  The patient's coronal balance is within normal limits.  The patient's sagittal balance is at the limit of normal measuring 5 cm from the C7 leo line to the superior inferior endplate of S1.  The patient apparently did obtain EMG/nerve conduction studies but I do not have the results of these today.  The patient was told verbally that her nerve conduction studies were "normal". \par \par Taken together, I explained to the patient that I do not have a good explanation as to the patient's ongoing issues of the left lower extremity.  I explained to the patient that her radiographic images appear stable without evidence of complication.  At this time, I explained to the patient that there does not appear to be a surgical lesion on her images capable of causing her ongoing pain.  At this time, the patient has requested a hip evaluation and I have provided a referral for this purpose.  I have also recommended continuing physical therapy given the presence of weakness in the left lower extremity.  I have also recommended a pain management physician to help the patient with symptomatic management.  At this time, the patient will be following up with us on an as-needed basis. [FreeTextEntry3] : Andrew Morales MD, PhD, CS, FAANS Attending Neurosurgeon  of Neurosurgery [facility] at [facility] at [address], [place] [address] Office: [phone] Fax: [phone]

## 2023-02-06 NOTE — H&P PST ADULT - VENOUS THROMBOEMBOLISM
Reviewed labs which show iron def anemia likely due to blood loss given her recent rectal bleeding. Sending referral to her gastro specialist Dr De Leon.    no

## 2023-02-15 ENCOUNTER — APPOINTMENT (OUTPATIENT)
Dept: MRI IMAGING | Facility: CLINIC | Age: 68
End: 2023-02-15
Payer: MEDICARE

## 2023-02-15 ENCOUNTER — OUTPATIENT (OUTPATIENT)
Dept: OUTPATIENT SERVICES | Facility: HOSPITAL | Age: 68
LOS: 1 days | End: 2023-02-15
Payer: MEDICARE

## 2023-02-15 DIAGNOSIS — Z00.8 ENCOUNTER FOR OTHER GENERAL EXAMINATION: ICD-10-CM

## 2023-02-15 DIAGNOSIS — Z98.890 OTHER SPECIFIED POSTPROCEDURAL STATES: Chronic | ICD-10-CM

## 2023-02-15 DIAGNOSIS — Z98.1 ARTHRODESIS STATUS: Chronic | ICD-10-CM

## 2023-02-15 DIAGNOSIS — Z98.89 OTHER SPECIFIED POSTPROCEDURAL STATES: Chronic | ICD-10-CM

## 2023-02-15 DIAGNOSIS — M25.552 PAIN IN LEFT HIP: ICD-10-CM

## 2023-02-15 PROCEDURE — 73721 MRI JNT OF LWR EXTRE W/O DYE: CPT | Mod: MH

## 2023-02-15 PROCEDURE — 73721 MRI JNT OF LWR EXTRE W/O DYE: CPT | Mod: 26,LT,MH

## 2023-03-03 ENCOUNTER — EMERGENCY (EMERGENCY)
Facility: HOSPITAL | Age: 68
LOS: 0 days | Discharge: HOME | End: 2023-03-03
Attending: STUDENT IN AN ORGANIZED HEALTH CARE EDUCATION/TRAINING PROGRAM
Payer: MEDICARE

## 2023-03-03 VITALS
DIASTOLIC BLOOD PRESSURE: 82 MMHG | RESPIRATION RATE: 18 BRPM | SYSTOLIC BLOOD PRESSURE: 107 MMHG | HEART RATE: 77 BPM | TEMPERATURE: 98 F | OXYGEN SATURATION: 100 %

## 2023-03-03 VITALS — WEIGHT: 132.94 LBS | HEIGHT: 65 IN

## 2023-03-03 DIAGNOSIS — Z79.82 LONG TERM (CURRENT) USE OF ASPIRIN: ICD-10-CM

## 2023-03-03 DIAGNOSIS — M32.9 SYSTEMIC LUPUS ERYTHEMATOSUS, UNSPECIFIED: ICD-10-CM

## 2023-03-03 DIAGNOSIS — Z98.890 OTHER SPECIFIED POSTPROCEDURAL STATES: Chronic | ICD-10-CM

## 2023-03-03 DIAGNOSIS — F10.19 ALCOHOL ABUSE WITH UNSPECIFIED ALCOHOL-INDUCED DISORDER: ICD-10-CM

## 2023-03-03 DIAGNOSIS — R10.12 LEFT UPPER QUADRANT PAIN: ICD-10-CM

## 2023-03-03 DIAGNOSIS — R10.9 UNSPECIFIED ABDOMINAL PAIN: ICD-10-CM

## 2023-03-03 DIAGNOSIS — Z98.1 ARTHRODESIS STATUS: Chronic | ICD-10-CM

## 2023-03-03 DIAGNOSIS — Z98.89 OTHER SPECIFIED POSTPROCEDURAL STATES: Chronic | ICD-10-CM

## 2023-03-03 DIAGNOSIS — E87.1 HYPO-OSMOLALITY AND HYPONATREMIA: ICD-10-CM

## 2023-03-03 DIAGNOSIS — M47.816 SPONDYLOSIS WITHOUT MYELOPATHY OR RADICULOPATHY, LUMBAR REGION: ICD-10-CM

## 2023-03-03 DIAGNOSIS — I70.90 UNSPECIFIED ATHEROSCLEROSIS: ICD-10-CM

## 2023-03-03 DIAGNOSIS — Z86.16 PERSONAL HISTORY OF COVID-19: ICD-10-CM

## 2023-03-03 DIAGNOSIS — R11.2 NAUSEA WITH VOMITING, UNSPECIFIED: ICD-10-CM

## 2023-03-03 DIAGNOSIS — R10.11 RIGHT UPPER QUADRANT PAIN: ICD-10-CM

## 2023-03-03 DIAGNOSIS — Z79.02 LONG TERM (CURRENT) USE OF ANTITHROMBOTICS/ANTIPLATELETS: ICD-10-CM

## 2023-03-03 DIAGNOSIS — G50.0 TRIGEMINAL NEURALGIA: ICD-10-CM

## 2023-03-03 DIAGNOSIS — F41.9 ANXIETY DISORDER, UNSPECIFIED: ICD-10-CM

## 2023-03-03 LAB
ALBUMIN SERPL ELPH-MCNC: 4.5 G/DL — SIGNIFICANT CHANGE UP (ref 3.3–5)
ALP SERPL-CCNC: 62 U/L — SIGNIFICANT CHANGE UP (ref 40–120)
ALT FLD-CCNC: 21 U/L — SIGNIFICANT CHANGE UP (ref 12–78)
ANION GAP SERPL CALC-SCNC: 7 MMOL/L — SIGNIFICANT CHANGE UP (ref 5–17)
ANION GAP SERPL CALC-SCNC: 7 MMOL/L — SIGNIFICANT CHANGE UP (ref 5–17)
APPEARANCE UR: CLEAR — SIGNIFICANT CHANGE UP
APTT BLD: 31.6 SEC — SIGNIFICANT CHANGE UP (ref 27.5–35.5)
AST SERPL-CCNC: 33 U/L — SIGNIFICANT CHANGE UP (ref 15–37)
BASOPHILS # BLD AUTO: 0.01 K/UL — SIGNIFICANT CHANGE UP (ref 0–0.2)
BASOPHILS NFR BLD AUTO: 0.1 % — SIGNIFICANT CHANGE UP (ref 0–2)
BILIRUB SERPL-MCNC: 0.8 MG/DL — SIGNIFICANT CHANGE UP (ref 0.2–1.2)
BILIRUB UR-MCNC: NEGATIVE — SIGNIFICANT CHANGE UP
BUN SERPL-MCNC: 16 MG/DL — SIGNIFICANT CHANGE UP (ref 7–23)
BUN SERPL-MCNC: 18 MG/DL — SIGNIFICANT CHANGE UP (ref 7–23)
CALCIUM SERPL-MCNC: 8.7 MG/DL — SIGNIFICANT CHANGE UP (ref 8.5–10.1)
CALCIUM SERPL-MCNC: 9.5 MG/DL — SIGNIFICANT CHANGE UP (ref 8.5–10.1)
CHLORIDE SERPL-SCNC: 100 MMOL/L — SIGNIFICANT CHANGE UP (ref 96–108)
CHLORIDE SERPL-SCNC: 96 MMOL/L — SIGNIFICANT CHANGE UP (ref 96–108)
CO2 SERPL-SCNC: 24 MMOL/L — SIGNIFICANT CHANGE UP (ref 22–31)
CO2 SERPL-SCNC: 25 MMOL/L — SIGNIFICANT CHANGE UP (ref 22–31)
COLOR SPEC: YELLOW — SIGNIFICANT CHANGE UP
CREAT SERPL-MCNC: 0.6 MG/DL — SIGNIFICANT CHANGE UP (ref 0.5–1.3)
CREAT SERPL-MCNC: 0.7 MG/DL — SIGNIFICANT CHANGE UP (ref 0.5–1.3)
DIFF PNL FLD: NEGATIVE — SIGNIFICANT CHANGE UP
EGFR: 94 ML/MIN/1.73M2 — SIGNIFICANT CHANGE UP
EGFR: 97 ML/MIN/1.73M2 — SIGNIFICANT CHANGE UP
EOSINOPHIL # BLD AUTO: 0 K/UL — SIGNIFICANT CHANGE UP (ref 0–0.5)
EOSINOPHIL NFR BLD AUTO: 0 % — SIGNIFICANT CHANGE UP (ref 0–6)
GLUCOSE SERPL-MCNC: 86 MG/DL — SIGNIFICANT CHANGE UP (ref 70–99)
GLUCOSE SERPL-MCNC: 96 MG/DL — SIGNIFICANT CHANGE UP (ref 70–99)
GLUCOSE UR QL: NEGATIVE — SIGNIFICANT CHANGE UP
HCT VFR BLD CALC: 41.6 % — SIGNIFICANT CHANGE UP (ref 34.5–45)
HGB BLD-MCNC: 14.3 G/DL — SIGNIFICANT CHANGE UP (ref 11.5–15.5)
IMM GRANULOCYTES NFR BLD AUTO: 0.4 % — SIGNIFICANT CHANGE UP (ref 0–0.9)
INR BLD: 1.11 RATIO — SIGNIFICANT CHANGE UP (ref 0.88–1.16)
KETONES UR-MCNC: ABNORMAL
LACTATE SERPL-SCNC: 1.2 MMOL/L — SIGNIFICANT CHANGE UP (ref 0.7–2)
LEUKOCYTE ESTERASE UR-ACNC: NEGATIVE — SIGNIFICANT CHANGE UP
LIDOCAIN IGE QN: 85 U/L — SIGNIFICANT CHANGE UP (ref 73–393)
LYMPHOCYTES # BLD AUTO: 0.73 K/UL — LOW (ref 1–3.3)
LYMPHOCYTES # BLD AUTO: 10.5 % — LOW (ref 13–44)
MCHC RBC-ENTMCNC: 32.2 PG — SIGNIFICANT CHANGE UP (ref 27–34)
MCHC RBC-ENTMCNC: 34.4 GM/DL — SIGNIFICANT CHANGE UP (ref 32–36)
MCV RBC AUTO: 93.7 FL — SIGNIFICANT CHANGE UP (ref 80–100)
MONOCYTES # BLD AUTO: 0.42 K/UL — SIGNIFICANT CHANGE UP (ref 0–0.9)
MONOCYTES NFR BLD AUTO: 6 % — SIGNIFICANT CHANGE UP (ref 2–14)
NEUTROPHILS # BLD AUTO: 5.79 K/UL — SIGNIFICANT CHANGE UP (ref 1.8–7.4)
NEUTROPHILS NFR BLD AUTO: 83 % — HIGH (ref 43–77)
NITRITE UR-MCNC: NEGATIVE — SIGNIFICANT CHANGE UP
PH UR: 5 — SIGNIFICANT CHANGE UP (ref 5–8)
PLATELET # BLD AUTO: 274 K/UL — SIGNIFICANT CHANGE UP (ref 150–400)
POTASSIUM SERPL-MCNC: 4.2 MMOL/L — SIGNIFICANT CHANGE UP (ref 3.5–5.3)
POTASSIUM SERPL-MCNC: 4.3 MMOL/L — SIGNIFICANT CHANGE UP (ref 3.5–5.3)
POTASSIUM SERPL-SCNC: 4.2 MMOL/L — SIGNIFICANT CHANGE UP (ref 3.5–5.3)
POTASSIUM SERPL-SCNC: 4.3 MMOL/L — SIGNIFICANT CHANGE UP (ref 3.5–5.3)
PROT SERPL-MCNC: 7.7 GM/DL — SIGNIFICANT CHANGE UP (ref 6–8.3)
PROT UR-MCNC: NEGATIVE — SIGNIFICANT CHANGE UP
PROTHROM AB SERPL-ACNC: 12.9 SEC — SIGNIFICANT CHANGE UP (ref 10.5–13.4)
RBC # BLD: 4.44 M/UL — SIGNIFICANT CHANGE UP (ref 3.8–5.2)
RBC # FLD: 11.8 % — SIGNIFICANT CHANGE UP (ref 10.3–14.5)
SODIUM SERPL-SCNC: 128 MMOL/L — LOW (ref 135–145)
SODIUM SERPL-SCNC: 131 MMOL/L — LOW (ref 135–145)
SP GR SPEC: 1.01 — SIGNIFICANT CHANGE UP (ref 1.01–1.02)
UROBILINOGEN FLD QL: NEGATIVE — SIGNIFICANT CHANGE UP
WBC # BLD: 6.98 K/UL — SIGNIFICANT CHANGE UP (ref 3.8–10.5)
WBC # FLD AUTO: 6.98 K/UL — SIGNIFICANT CHANGE UP (ref 3.8–10.5)

## 2023-03-03 PROCEDURE — 86901 BLOOD TYPING SEROLOGIC RH(D): CPT

## 2023-03-03 PROCEDURE — 85025 COMPLETE CBC W/AUTO DIFF WBC: CPT

## 2023-03-03 PROCEDURE — 81003 URINALYSIS AUTO W/O SCOPE: CPT

## 2023-03-03 PROCEDURE — 85610 PROTHROMBIN TIME: CPT

## 2023-03-03 PROCEDURE — 74177 CT ABD & PELVIS W/CONTRAST: CPT | Mod: 26,MG

## 2023-03-03 PROCEDURE — 74177 CT ABD & PELVIS W/CONTRAST: CPT | Mod: MG

## 2023-03-03 PROCEDURE — 96375 TX/PRO/DX INJ NEW DRUG ADDON: CPT

## 2023-03-03 PROCEDURE — 85730 THROMBOPLASTIN TIME PARTIAL: CPT

## 2023-03-03 PROCEDURE — 80053 COMPREHEN METABOLIC PANEL: CPT

## 2023-03-03 PROCEDURE — 86850 RBC ANTIBODY SCREEN: CPT

## 2023-03-03 PROCEDURE — 99284 EMERGENCY DEPT VISIT MOD MDM: CPT | Mod: 25

## 2023-03-03 PROCEDURE — 83690 ASSAY OF LIPASE: CPT

## 2023-03-03 PROCEDURE — 36415 COLL VENOUS BLD VENIPUNCTURE: CPT

## 2023-03-03 PROCEDURE — 99285 EMERGENCY DEPT VISIT HI MDM: CPT | Mod: FS

## 2023-03-03 PROCEDURE — 80048 BASIC METABOLIC PNL TOTAL CA: CPT

## 2023-03-03 PROCEDURE — 83605 ASSAY OF LACTIC ACID: CPT

## 2023-03-03 PROCEDURE — G1004: CPT

## 2023-03-03 PROCEDURE — 96374 THER/PROPH/DIAG INJ IV PUSH: CPT | Mod: XU

## 2023-03-03 PROCEDURE — 86900 BLOOD TYPING SEROLOGIC ABO: CPT

## 2023-03-03 RX ORDER — ONDANSETRON 8 MG/1
1 TABLET, FILM COATED ORAL
Qty: 6 | Refills: 0
Start: 2023-03-03 | End: 2023-03-05

## 2023-03-03 RX ORDER — MORPHINE SULFATE 50 MG/1
4 CAPSULE, EXTENDED RELEASE ORAL ONCE
Refills: 0 | Status: DISCONTINUED | OUTPATIENT
Start: 2023-03-03 | End: 2023-03-03

## 2023-03-03 RX ORDER — SODIUM CHLORIDE 9 MG/ML
2000 INJECTION INTRAMUSCULAR; INTRAVENOUS; SUBCUTANEOUS ONCE
Refills: 0 | Status: COMPLETED | OUTPATIENT
Start: 2023-03-03 | End: 2023-03-03

## 2023-03-03 RX ORDER — ONDANSETRON 8 MG/1
4 TABLET, FILM COATED ORAL ONCE
Refills: 0 | Status: COMPLETED | OUTPATIENT
Start: 2023-03-03 | End: 2023-03-03

## 2023-03-03 RX ADMIN — MORPHINE SULFATE 4 MILLIGRAM(S): 50 CAPSULE, EXTENDED RELEASE ORAL at 19:15

## 2023-03-03 RX ADMIN — ONDANSETRON 4 MILLIGRAM(S): 8 TABLET, FILM COATED ORAL at 19:14

## 2023-03-03 RX ADMIN — SODIUM CHLORIDE 2000 MILLILITER(S): 9 INJECTION INTRAMUSCULAR; INTRAVENOUS; SUBCUTANEOUS at 19:14

## 2023-03-03 NOTE — ED STATDOCS - PROGRESS NOTE DETAILS
pt reeval offers no complaints, pt states her NA is usually low from her medication she is usually lower 120's- 130's. pt states 128 is normal for her. will continue to give ivfs. -Loretta Lee PA-C pt tolerated pudding and aware of her NA results is 131, it improved. pt asked for antiemetics. pt well appearing on dc and agrees with plan to fu with pmd.  -Loretta Lee PA-C

## 2023-03-03 NOTE — ED ADULT NURSE NOTE - OBJECTIVE STATEMENT
pt presents to ED from home for abdominal pain, nausea, vomiting today since 6AM. unable to tolerate anything PO.

## 2023-03-03 NOTE — ED STATDOCS - NS_ ATTENDINGSCRIBEDETAILS _ED_A_ED_FT
The scribe's documentation has been prepared under my direction and personally reviewed by me in its entirety. I confirm that the note above accurately reflects all work, treatment, procedures, and medical decision making performed by me.  PARDEEP Burns-MS, MD  Internal/Emergency/Critical Care Medicine

## 2023-03-03 NOTE — ED STATDOCS - CLINICAL SUMMARY MEDICAL DECISION MAKING FREE TEXT BOX
69 yo female with PMHx of Lupus, presents with abd pain, n/v. concern for gastritis vs pancreatitis. low suspicion for pyelonephritis. Not consistent with pt's lupus in the past. Will get labwork, give Iv fluids, pain meds, antiemetics and get CT abd pelvis. Check EKG.

## 2023-03-03 NOTE — ED STATDOCS - NS ED ROS FT
Constitutional: Denies fevers & chills  HEENT: Rhinorrhea & sore throat  Cardiac: Denies Chest pain & new LE edema  Pulmonary: Denies Shortness of breath & Cough  Abdomen: +abd pain, +n/v  : Denies dysuria & hematuria.  Skin: Denies Rash or itching  Neuro: Denies new visual changes, confusion, headaches, and one sided paralysis  Psych: Denies SI & HI & Depression

## 2023-03-03 NOTE — ED STATDOCS - PHYSICAL EXAMINATION
PHYSICAL EXAM:  GENERAL: in NAD, Sitting comfortable in bed, in no respiratory distress  HEAD: Atraumatic, no rinaldi's sign, no periorbital ecchymosis   EYES: PERRL, EOMs intact b/l w/out deficits  ENMT: dry mucous membranes , no anterior/posterior, or supraclavicular LAD  CHEST/LUNG: CTAB no wheezes/rhonchi/rales  HEART: RRR no murmur/gallops/rubs  ABDOMEN: +Bowel sounds, abd diffusely TTP more so in R and L upper quadrant  EXTREMITIES: No LE edema, +2 radial pulses b/l  NERVOUS SYSTEM:  A&Ox4, No motor deficits or sensory deficits to b/l UEs  Heme/LYMPH: No ecchymosis or bruising or LAD  SKIN:  skin tenting for more than 3 seconds, abnormal cap refill of greater than 2 seconds

## 2023-03-03 NOTE — ED STATDOCS - PATIENT PORTAL LINK FT
You can access the FollowMyHealth Patient Portal offered by [facility] by registering at the following website: [email or web address]. By joining [employer]’s FollowMyHealth portal, you will also be able to view your health information using other applications (apps) compatible with our system.

## 2023-03-03 NOTE — ED STATDOCS - NS ED ATTENDING STATEMENT MOD
This was a shared visit with the GERTRUDE. I reviewed and verified the documentation and independently performed the documented:

## 2023-03-03 NOTE — ED STATDOCS - OBJECTIVE STATEMENT
68 year old female with PMHx lupus on hydroxychloroquine, trigeminal neuralgia presents to the ED c/o n/v and diffuse abd pain that started this morning at 6 am. Pt reports that this does not feel like prior Lupus flare ups. Pt notes that she drank one glass of EtOH last night but nothing excessive. Denies any fever, sick contacts, no blood in stool or vomit. No dysuria.

## 2023-03-04 RX ORDER — SUCRALFATE 1 G
1 TABLET ORAL
Qty: 30 | Refills: 0
Start: 2023-03-04

## 2023-03-04 NOTE — ED POST DISCHARGE NOTE - REASON FOR FOLLOW-UP
Other Pharmacy called citing interaction between zofran and plaquenil. Will substitute zofran with carafate. - Kwaku Oquendo PA-C

## 2024-01-30 NOTE — ASU DISCHARGE PLAN (ADULT/PEDIATRIC) - PROVIDER TOKENS
PROVIDER:[TOKEN:[[id number]]] Continue Regimen: Skyrizi Detail Level: Zone Render In Strict Bullet Format?: No

## 2024-03-26 ENCOUNTER — NON-APPOINTMENT (OUTPATIENT)
Age: 69
End: 2024-03-26

## 2024-03-28 ENCOUNTER — APPOINTMENT (OUTPATIENT)
Dept: NEUROSURGERY | Facility: CLINIC | Age: 69
End: 2024-03-28
Payer: MEDICARE

## 2024-03-28 VITALS
DIASTOLIC BLOOD PRESSURE: 86 MMHG | HEIGHT: 64 IN | WEIGHT: 130 LBS | HEART RATE: 70 BPM | SYSTOLIC BLOOD PRESSURE: 153 MMHG | OXYGEN SATURATION: 97 % | BODY MASS INDEX: 22.2 KG/M2

## 2024-03-28 DIAGNOSIS — M54.16 RADICULOPATHY, LUMBAR REGION: ICD-10-CM

## 2024-03-28 DIAGNOSIS — Z98.1 ARTHRODESIS STATUS: ICD-10-CM

## 2024-03-28 PROCEDURE — 99213 OFFICE O/P EST LOW 20 MIN: CPT

## 2024-03-28 PROCEDURE — 99203 OFFICE O/P NEW LOW 30 MIN: CPT

## 2024-03-28 NOTE — CONSULT LETTER
[Dear  ___] : Dear  [unfilled], [Sincerely,] : Sincerely, [Courtesy Letter:] : I had the pleasure of seeing your patient, [unfilled], in my office today. [FreeTextEntry2] : Mike Rainey MD  [address]  [address]  [place] [address]     [FreeTextEntry1] : Mrs. Mariano is a very pleasant 69-year-old female patient who was seen in our office today regarding low back and left-sided leg pain.  The patient was previously seen regarding these same issues a little over a year ago.  The patient obtained an extensive workup of the spine through our office which did not reveal any structural lesions.  The patient's pain remains primarily in the left side of the back radiating down the length of her leg possibly in an L5 nerve distribution.  The patient states that her symptoms have worsened despite conservative therapy and wishes to be reassessed from a structural perspective.  On examination, the patient remains alert, oriented, and compliant with the exam.  The patient continues demonstrate full strength in the right lower extremity.  The patient's left lower extremity continues to demonstrate weakness with hip flexion, and ankle dorsiflexion, which is rated at 4/5.  The patient continues to ambulate with an antalgic gait.  I have no new imaging to review today.  The patient has a bone density scan performed on January 23, 2023 which demonstrated osteoporosis.  The patient's most recent MRI scan of the lumbar spine was performed on January 24, 2023 which demonstrated instrumentation from L3-L4 without evidence of hardware complication.  No ongoing nerve root or cauda equina compression was noted on these images.  The patient has standing scoliosis films performed on January 19, 2023 which did not reveal any significant coronal or sagittal imbalance.  Taken together, the patient continues to have a clinical history and radiographic findings most consistent with an L5 nerve root radiculopathy without a structural lesion.  At this time, I have offered to reinvestigate her case through images and electrophysiologic findings but I have informed the patient that my suspicion for a new structural lesion is low.  Moreover, even if a new structural lesion is discovered, surgical intervention to remove such a lesion would likely only return the patient to her functional state last year where she was still having low back and left-sided leg pain.  The role of a spinal cord stimulator was discussed with the patient and the patient will have this conversation as well with her pain management physician.  The patient will return to our office to review her imaging findings and electrophysiologic studies once they become available. [FreeTextEntry3] : Andrew Morales MD, PhD, CS, FAANS Attending Neurosurgeon  of Neurosurgery [facility] at [facility] at [address], [place] [address] Office: [phone] Fax: [phone]

## 2024-04-08 DIAGNOSIS — M54.12 RADICULOPATHY, CERVICAL REGION: ICD-10-CM

## 2024-04-10 ENCOUNTER — OUTPATIENT (OUTPATIENT)
Dept: OUTPATIENT SERVICES | Facility: HOSPITAL | Age: 69
LOS: 1 days | End: 2024-04-10
Payer: MEDICARE

## 2024-04-10 ENCOUNTER — APPOINTMENT (OUTPATIENT)
Dept: MRI IMAGING | Facility: CLINIC | Age: 69
End: 2024-04-10
Payer: MEDICARE

## 2024-04-10 DIAGNOSIS — M54.16 RADICULOPATHY, LUMBAR REGION: ICD-10-CM

## 2024-04-10 DIAGNOSIS — Z98.1 ARTHRODESIS STATUS: Chronic | ICD-10-CM

## 2024-04-10 DIAGNOSIS — Z98.890 OTHER SPECIFIED POSTPROCEDURAL STATES: Chronic | ICD-10-CM

## 2024-04-10 DIAGNOSIS — Z98.89 OTHER SPECIFIED POSTPROCEDURAL STATES: Chronic | ICD-10-CM

## 2024-04-10 DIAGNOSIS — Z98.1 ARTHRODESIS STATUS: ICD-10-CM

## 2024-04-10 PROCEDURE — 72082 X-RAY EXAM ENTIRE SPI 2/3 VW: CPT | Mod: 26

## 2024-04-10 PROCEDURE — 72082 X-RAY EXAM ENTIRE SPI 2/3 VW: CPT

## 2024-04-10 PROCEDURE — 72148 MRI LUMBAR SPINE W/O DYE: CPT

## 2024-04-10 PROCEDURE — 72148 MRI LUMBAR SPINE W/O DYE: CPT | Mod: 26,MH

## 2024-09-12 NOTE — H&P PST ADULT - NECK DETAILS
Results released to [employer].    Thank you for performing these tests prior to your appointment.      Hormone results look normal.      We will discuss these results in depth at your coming appointment.    
supple

## 2024-12-21 ENCOUNTER — NON-APPOINTMENT (OUTPATIENT)
Age: 69
End: 2024-12-21

## 2024-12-21 ENCOUNTER — RESULT REVIEW (OUTPATIENT)
Age: 69
End: 2024-12-21

## 2025-01-24 NOTE — H&P PST ADULT - PRO INTERPRETER NEED 2
[employer] Approved  Auth Number: [id number]   01/1/25 through 01/24/26  Filling Pharmacy: [facility]    
[facility] Pending    Insurance response  Prescription Drug Insurance: [employer]  Notes: Prior authorization submitted - will update provider when decision has been made by insurance.     Key: [id number]   MADELAINE, 296 lbs, BMI 41.40    
English